# Patient Record
Sex: MALE | Race: WHITE | NOT HISPANIC OR LATINO
[De-identification: names, ages, dates, MRNs, and addresses within clinical notes are randomized per-mention and may not be internally consistent; named-entity substitution may affect disease eponyms.]

---

## 2017-05-09 ENCOUNTER — RX RENEWAL (OUTPATIENT)
Age: 52
End: 2017-05-09

## 2017-06-06 ENCOUNTER — NON-APPOINTMENT (OUTPATIENT)
Age: 52
End: 2017-06-06

## 2017-06-06 ENCOUNTER — APPOINTMENT (OUTPATIENT)
Dept: INTERNAL MEDICINE | Facility: CLINIC | Age: 52
End: 2017-06-06

## 2017-06-06 VITALS
SYSTOLIC BLOOD PRESSURE: 134 MMHG | OXYGEN SATURATION: 96 % | RESPIRATION RATE: 18 BRPM | DIASTOLIC BLOOD PRESSURE: 96 MMHG | HEART RATE: 62 BPM | WEIGHT: 196 LBS | BODY MASS INDEX: 28.06 KG/M2 | HEIGHT: 70 IN | TEMPERATURE: 98.5 F

## 2017-06-06 DIAGNOSIS — K64.9 UNSPECIFIED HEMORRHOIDS: ICD-10-CM

## 2017-06-13 LAB
ALBUMIN SERPL ELPH-MCNC: 4.8 G/DL
ALP BLD-CCNC: 56 U/L
ALT SERPL-CCNC: 41 U/L
ANION GAP SERPL CALC-SCNC: 21 MMOL/L
APPEARANCE: CLEAR
AST SERPL-CCNC: 28 U/L
BASOPHILS # BLD AUTO: 0.01 K/UL
BASOPHILS NFR BLD AUTO: 0.2 %
BILIRUB SERPL-MCNC: 0.3 MG/DL
BILIRUBIN URINE: NEGATIVE
BLOOD URINE: NEGATIVE
BUN SERPL-MCNC: 20 MG/DL
CALCIUM SERPL-MCNC: 9.6 MG/DL
CHLORIDE SERPL-SCNC: 105 MMOL/L
CHOLEST SERPL-MCNC: 207 MG/DL
CHOLEST/HDLC SERPL: 2.9 RATIO
CO2 SERPL-SCNC: 20 MMOL/L
COLOR: YELLOW
CREAT SERPL-MCNC: 1.06 MG/DL
EOSINOPHIL # BLD AUTO: 0.21 K/UL
EOSINOPHIL NFR BLD AUTO: 3.2 %
GLUCOSE QUALITATIVE U: NORMAL MG/DL
GLUCOSE SERPL-MCNC: 115 MG/DL
HBA1C MFR BLD HPLC: 5.5 %
HCT VFR BLD CALC: 44.8 %
HDLC SERPL-MCNC: 72 MG/DL
HGB BLD-MCNC: 15.5 G/DL
IMM GRANULOCYTES NFR BLD AUTO: 0.2 %
KETONES URINE: NEGATIVE
LDLC SERPL CALC-MCNC: 121 MG/DL
LEUKOCYTE ESTERASE URINE: NEGATIVE
LYMPHOCYTES # BLD AUTO: 1.85 K/UL
LYMPHOCYTES NFR BLD AUTO: 27.9 %
MAN DIFF?: NORMAL
MCHC RBC-ENTMCNC: 32.2 PG
MCHC RBC-ENTMCNC: 34.6 GM/DL
MCV RBC AUTO: 92.9 FL
MONOCYTES # BLD AUTO: 0.49 K/UL
MONOCYTES NFR BLD AUTO: 7.4 %
NEUTROPHILS # BLD AUTO: 4.07 K/UL
NEUTROPHILS NFR BLD AUTO: 61.1 %
NITRITE URINE: NEGATIVE
PH URINE: 6
PLATELET # BLD AUTO: 217 K/UL
POTASSIUM SERPL-SCNC: 4.3 MMOL/L
PROT SERPL-MCNC: 7.3 G/DL
PROTEIN URINE: NEGATIVE MG/DL
PSA FREE FLD-MCNC: 15
PSA FREE SERPL-MCNC: 0.09 NG/ML
PSA SERPL-MCNC: 0.6 NG/ML
RBC # BLD: 4.82 M/UL
RBC # FLD: 12.4 %
SODIUM SERPL-SCNC: 146 MMOL/L
SPECIFIC GRAVITY URINE: 1.01
TRIGL SERPL-MCNC: 69 MG/DL
TSH SERPL-ACNC: 1.3 UIU/ML
UROBILINOGEN URINE: NORMAL MG/DL
WBC # FLD AUTO: 6.64 K/UL

## 2017-12-06 LAB — HEMOCCULT STL QL IA: NEGATIVE

## 2017-12-18 ENCOUNTER — RX RENEWAL (OUTPATIENT)
Age: 52
End: 2017-12-18

## 2017-12-19 ENCOUNTER — RX RENEWAL (OUTPATIENT)
Age: 52
End: 2017-12-19

## 2018-02-01 LAB
ALBUMIN SERPL ELPH-MCNC: 4.5 G/DL
ALP BLD-CCNC: 45 U/L
ALT SERPL-CCNC: 40 U/L
ANION GAP SERPL CALC-SCNC: 14 MMOL/L
AST SERPL-CCNC: 28 U/L
BILIRUB SERPL-MCNC: 0.4 MG/DL
BUN SERPL-MCNC: 20 MG/DL
CALCIUM SERPL-MCNC: 9.5 MG/DL
CHLORIDE SERPL-SCNC: 105 MMOL/L
CHOLEST SERPL-MCNC: 193 MG/DL
CHOLEST/HDLC SERPL: 2.3 RATIO
CO2 SERPL-SCNC: 24 MMOL/L
CREAT SERPL-MCNC: 1.07 MG/DL
GLUCOSE SERPL-MCNC: 102 MG/DL
HBA1C MFR BLD HPLC: 5.3 %
HDLC SERPL-MCNC: 83 MG/DL
LDLC SERPL CALC-MCNC: 99 MG/DL
POTASSIUM SERPL-SCNC: 4.9 MMOL/L
PROT SERPL-MCNC: 6.7 G/DL
SODIUM SERPL-SCNC: 143 MMOL/L
TRIGL SERPL-MCNC: 56 MG/DL

## 2018-06-05 ENCOUNTER — RX RENEWAL (OUTPATIENT)
Age: 53
End: 2018-06-05

## 2018-08-28 ENCOUNTER — APPOINTMENT (OUTPATIENT)
Dept: INTERNAL MEDICINE | Facility: CLINIC | Age: 53
End: 2018-08-28
Payer: COMMERCIAL

## 2018-08-28 ENCOUNTER — NON-APPOINTMENT (OUTPATIENT)
Age: 53
End: 2018-08-28

## 2018-08-28 VITALS
TEMPERATURE: 98.6 F | HEIGHT: 70 IN | WEIGHT: 198 LBS | RESPIRATION RATE: 18 BRPM | OXYGEN SATURATION: 98 % | DIASTOLIC BLOOD PRESSURE: 93 MMHG | BODY MASS INDEX: 28.35 KG/M2 | SYSTOLIC BLOOD PRESSURE: 134 MMHG | HEART RATE: 71 BPM

## 2018-08-28 PROCEDURE — 99396 PREV VISIT EST AGE 40-64: CPT

## 2018-08-28 PROCEDURE — 93000 ELECTROCARDIOGRAM COMPLETE: CPT

## 2018-09-04 ENCOUNTER — RX RENEWAL (OUTPATIENT)
Age: 53
End: 2018-09-04

## 2018-09-05 LAB
ALBUMIN SERPL ELPH-MCNC: 4.9 G/DL
ALP BLD-CCNC: 55 U/L
ALT SERPL-CCNC: 37 U/L
ANION GAP SERPL CALC-SCNC: 12 MMOL/L
APPEARANCE: CLEAR
AST SERPL-CCNC: 27 U/L
BASOPHILS # BLD AUTO: 0.03 K/UL
BASOPHILS NFR BLD AUTO: 0.5 %
BILIRUB SERPL-MCNC: 0.5 MG/DL
BILIRUBIN URINE: NEGATIVE
BLOOD URINE: NEGATIVE
BUN SERPL-MCNC: 15 MG/DL
CALCIUM SERPL-MCNC: 9.7 MG/DL
CHLORIDE SERPL-SCNC: 104 MMOL/L
CHOLEST SERPL-MCNC: 184 MG/DL
CHOLEST/HDLC SERPL: 2.9 RATIO
CO2 SERPL-SCNC: 25 MMOL/L
COLOR: YELLOW
CREAT SERPL-MCNC: 0.91 MG/DL
EOSINOPHIL # BLD AUTO: 0.09 K/UL
EOSINOPHIL NFR BLD AUTO: 1.5 %
GLUCOSE QUALITATIVE U: NEGATIVE MG/DL
GLUCOSE SERPL-MCNC: 121 MG/DL
HBA1C MFR BLD HPLC: 5.4 %
HCT VFR BLD CALC: 44.7 %
HDLC SERPL-MCNC: 64 MG/DL
HGB BLD-MCNC: 14.9 G/DL
IMM GRANULOCYTES NFR BLD AUTO: 0.2 %
KETONES URINE: NEGATIVE
LDLC SERPL CALC-MCNC: 106 MG/DL
LEUKOCYTE ESTERASE URINE: NEGATIVE
LYMPHOCYTES # BLD AUTO: 1.17 K/UL
LYMPHOCYTES NFR BLD AUTO: 19.5 %
MAN DIFF?: NORMAL
MCHC RBC-ENTMCNC: 31 PG
MCHC RBC-ENTMCNC: 33.3 GM/DL
MCV RBC AUTO: 92.9 FL
MONOCYTES # BLD AUTO: 0.52 K/UL
MONOCYTES NFR BLD AUTO: 8.7 %
NEUTROPHILS # BLD AUTO: 4.18 K/UL
NEUTROPHILS NFR BLD AUTO: 69.6 %
NITRITE URINE: NEGATIVE
PH URINE: 6.5
PLATELET # BLD AUTO: 217 K/UL
POTASSIUM SERPL-SCNC: 4.4 MMOL/L
PROT SERPL-MCNC: 7 G/DL
PROTEIN URINE: NEGATIVE MG/DL
PSA FREE FLD-MCNC: 15.8
PSA FREE SERPL-MCNC: 0.09 NG/ML
PSA SERPL-MCNC: 0.57 NG/ML
RBC # BLD: 4.81 M/UL
RBC # FLD: 13.1 %
SODIUM SERPL-SCNC: 141 MMOL/L
SPECIFIC GRAVITY URINE: 1.01
T4 FREE SERPL-MCNC: 1.1 NG/DL
TRIGL SERPL-MCNC: 69 MG/DL
TSH SERPL-ACNC: 1.23 UIU/ML
UROBILINOGEN URINE: NEGATIVE MG/DL
WBC # FLD AUTO: 6 K/UL

## 2018-10-10 ENCOUNTER — MEDICATION RENEWAL (OUTPATIENT)
Age: 53
End: 2018-10-10

## 2018-10-18 ENCOUNTER — MED ADMIN CHARGE (OUTPATIENT)
Age: 53
End: 2018-10-18

## 2018-10-18 ENCOUNTER — APPOINTMENT (OUTPATIENT)
Dept: INTERNAL MEDICINE | Facility: CLINIC | Age: 53
End: 2018-10-18
Payer: COMMERCIAL

## 2018-10-18 PROCEDURE — 90715 TDAP VACCINE 7 YRS/> IM: CPT

## 2018-10-18 PROCEDURE — 90471 IMMUNIZATION ADMIN: CPT

## 2018-11-27 ENCOUNTER — RX RENEWAL (OUTPATIENT)
Age: 53
End: 2018-11-27

## 2019-02-26 ENCOUNTER — RX RENEWAL (OUTPATIENT)
Age: 54
End: 2019-02-26

## 2019-05-27 ENCOUNTER — RX RENEWAL (OUTPATIENT)
Age: 54
End: 2019-05-27

## 2019-07-17 ENCOUNTER — MEDICATION RENEWAL (OUTPATIENT)
Age: 54
End: 2019-07-17

## 2019-07-18 ENCOUNTER — APPOINTMENT (OUTPATIENT)
Dept: ORTHOPEDIC SURGERY | Facility: CLINIC | Age: 54
End: 2019-07-18
Payer: COMMERCIAL

## 2019-07-18 VITALS — HEIGHT: 70 IN | WEIGHT: 195 LBS | BODY MASS INDEX: 27.92 KG/M2

## 2019-07-18 DIAGNOSIS — Z86.79 PERSONAL HISTORY OF OTHER DISEASES OF THE CIRCULATORY SYSTEM: ICD-10-CM

## 2019-07-18 DIAGNOSIS — Z78.9 OTHER SPECIFIED HEALTH STATUS: ICD-10-CM

## 2019-07-18 DIAGNOSIS — Z86.39 PERSONAL HISTORY OF OTHER ENDOCRINE, NUTRITIONAL AND METABOLIC DISEASE: ICD-10-CM

## 2019-07-18 PROCEDURE — 20550 NJX 1 TENDON SHEATH/LIGAMENT: CPT | Mod: F7

## 2019-07-18 PROCEDURE — 99203 OFFICE O/P NEW LOW 30 MIN: CPT | Mod: 25

## 2019-07-18 NOTE — END OF VISIT
[FreeTextEntry3] : I, Say Bird MD, ordering physician, have read and attest that all the information, medical decision making and discharge instructions within are true and accurate.

## 2019-07-18 NOTE — ADDENDUM
[FreeTextEntry1] : I, Charo Fritz wrote this note acting as a scribe for Dr. Say Bird on Jul 18, 2019.

## 2019-07-18 NOTE — HISTORY OF PRESENT ILLNESS
[Right] : right hand dominant [FreeTextEntry1] : The patient is a RHD 53 year male who presents with a x 3 months history of locking, pain, and stiffness in his right long and left ring finger and progressively getting worse. The left finger started locking first. The finger symptoms are localized to the palmar aspect of the MP joint. The patient states that their symptoms were of gradual onset. It wakes him up at night. He states that he also wakes up sometimes with the fingers in a locked position. He describes his symptoms as frequent. There is an associated moderate pain. He states his symptoms are worsened by gripping and repetitive use/activity of the hand. He gains relief with rest. He denies any numbness or tingling. There is no history of injury or new activity. He has not received treatment to date. Patient denies DM.

## 2019-07-18 NOTE — PHYSICAL EXAM
[de-identified] : Patient is WDWN, alert, and in no acute distress. Breathing is unlabored. He is grossly oriented to person, place, and time. \par \par Right hand: There is A1 pulley tenderness in the long finger, full arc of motion in the fingers, and all intrinsic and extrinsic hand muscles 5/5. No joint instability on provocative testing, sensation is intact to light touch, and no skin lesions or discoloration. \par \par Left hand: There is A1 pulley tenderness in the ring finger, full arc of motion in the fingers, and all intrinsic and extrinsic hand muscles 5/5. No joint instability on provocative testing, sensation is intact to light touch, and no skin lesions or discoloration.  [de-identified] : No imaging done today.

## 2019-07-18 NOTE — DISCUSSION/SUMMARY
[FreeTextEntry1] : The underlying pathophysiology was reviewed with the patient. Treatment options were discussed including; observation, NSAIDS, analgesics, injection and surgical intervention. \par \par The patient wishes to proceed with a cortisone injection at this time. The skin was prepped with alcohol and sprayed with Ethyl Chloride. An injection of 0.5 cc 1% Lidocaine without epinephrine, 0.25 cc Kenalog 40mg, and 0.25 cc Dexamethasone was administered into the flexor tendon sheath of the right long and left ring finger. The patient tolerated the procedure well. Apply ice. \par \par The patient was advised to soak hand in warm water and Epsom salt. \par NSAIDs as tolerated. \par Patient was advised to continue with observation of his symptoms. Follow up as needed.

## 2019-07-19 ENCOUNTER — APPOINTMENT (OUTPATIENT)
Dept: ORTHOPEDIC SURGERY | Facility: CLINIC | Age: 54
End: 2019-07-19
Payer: COMMERCIAL

## 2019-07-19 VITALS — BODY MASS INDEX: 27.92 KG/M2 | HEIGHT: 70 IN | WEIGHT: 195 LBS

## 2019-07-19 DIAGNOSIS — M47.812 SPONDYLOSIS W/OUT MYELOPATHY OR RADICULOPATHY, CERVICAL REGION: ICD-10-CM

## 2019-07-19 DIAGNOSIS — M16.10 UNILATERAL PRIMARY OSTEOARTHRITIS, UNSPECIFIED HIP: ICD-10-CM

## 2019-07-19 PROCEDURE — 99214 OFFICE O/P EST MOD 30 MIN: CPT

## 2019-07-19 PROCEDURE — 73502 X-RAY EXAM HIP UNI 2-3 VIEWS: CPT | Mod: RT

## 2019-07-19 PROCEDURE — 72040 X-RAY EXAM NECK SPINE 2-3 VW: CPT

## 2019-07-19 NOTE — DISCUSSION/SUMMARY
[de-identified] : Discussed findings of today's exam and possible causes of patient's pain.  Educated patient on their most probable diagnosis of right-sided neck pain due to paraspinal muscle spasm and exacerbation of underlying osteoarthritis, and right hip pain due to mild osteoarthritis and likely degenerative labral tearing.  Reviewed possible courses of treatment, and we collaboratively decided best course of treatment at this time will include conservative management. I advised the patient that his current conditions are exacerbations do to his age and long occupation of working as a deli . Patient has also been doing lots of swimming recently which led to repetitive neck rotational activity, this is the likely etiology of his current neck pain. It is difficult for him to continue swimming as the crawl stroke we'll likely exacerbated neck pain, and altering to a forward head position with breaststroke will likely exacerbate his hip pain. Patient is recommended to limit swimming, and Ativan low impact cardiovascular exercise such as bike and elliptical.  Patient will be started on a course of physical therapy to restore normal range of motion and strength as tolerated. Patient may consider ultrasound guided right hip injection for diagnostic/therapeutic measures as a future treatment option. Recommend advance imaging with MRI of the C-spine to evaluate for facet arthropathy/right-sided herniation, patient does not have significant radiculopathy but may benefit from facet steroid injection if he has persistent right-sided neck pain. He may followup on his MRI results are available.  Patient started on a course of oral NSAIDs, prescription given for Mobic.  Patient appreciates and agrees with current plan.\par \par This note was generated using dragon medical dictation software.  A reasonable effort has been made for proofreading its contents, but typos may still remain.  If there are any questions or points of clarification needed please notify my office.

## 2019-07-19 NOTE — HISTORY OF PRESENT ILLNESS
[de-identified] : Patient is here for neck pain that began about 5 months ago without inciting event. He states that he began swimming and when turning his head to the right to breath he noticed the pain. It has become progressively worse over time. He takes Ibuprofen for headaches occasionally. He states that it feels like someone has their hand around the right side of his neck. Denies N/T/R/Prior injury. \par He also complains of right hip pain that began about 3 months ago without inciting event. The pain is localized to the lateral aspect of his leg but will sometimes radiate posteriorly. He has done nothing to treat this pain. Denies N/T/R/Prior injury.

## 2019-07-19 NOTE — PHYSICAL EXAM
[de-identified] : Constitutional: Well-nourished, well-developed, No acute distress\par Respiratory:  Good respiratory effort, no SOB\par Lymphatic: No regional lymphadenopathy, no lymphedema\par Psychiatric: Pleasant and normal affect, alert and oriented x3\par Skin: Clean dry and intact B/L UE/LE\par Musculoskeletal: normal except where as noted in regional exam\par \par Cervical Spine Exam\par APPEARANCE: no marked deformities or malalignment, normal curvature, good posture\par POSITIVE TENDERNESS: + Right upper trapezius, levator scapula, rhomboid major, and rhomboid minor, + spasm noted in the same muscles.\par NONTENDER: no bony midline tenderness.\par ROM: limited in all planes, most notably in flexion and sidebending due to pain\par RESISTIVE TESTING: painful 4/5 resisted ext, right sidebending, rotation and shoulder shrug , 5/5 flexion \par SPECIAL TESTS: neg Spurling's b/l\par Vasc: 2+ radial pulse b/l\par Neuro: C5 - T1 intact to motor, DTRs 2+/4 biceps, triceps, brachioradialis\par Sensation: Intact to light touch throughout b/l UE\par Thoracic spine:  normal curvature and normal alignment. good posture. no midline bony tenderness, no marked spasm. no marked tenderness in paraspinal muscles.  ROM full & painless all planes\par B/L Shoulders:  No asymmetry, malalignment, or swelling, Full ROM, 5/5 strength in flexion/ext, Abd/Add, IR/ER, Joints stable\par B/L Elbows:  No asymmetry, malalignment, or swelling, Full ROM, 5/5 strength in flexion/ext, pronation/supination, Joints stable\par B/L Wrist and Hand:  No asymmetry, malalignment, or swelling, Full ROM, 5/5 strength in wrist and long finger flexion/ext, radial/ulnar deviation, Joints stable\par \par Left Hip:\par \par APPEARANCE: no marked deformities, no swelling or malalignment\par POSITIVE TENDERNESS: none\par NONTENDER greater trochanter, TFL, gluteal region, ischium/proximal hamstring region, hip flexor region, sartorius and pubic symphysis. \par ROM full, painless all planes. \par RESISTIVE TESTING: painless resisted ER/IR/SLR/abduction/adduction. \par SPECIAL TESTS: painless loaded flexion & scouring\par PULSES: 2+ DP/PT pulses\par Neuro:  NL sensation of thigh and lower extremity, DTRs 2+/4 patella and achilles\par \par \par B/L Ankles: No asymmetry, malalignment, or swelling, Full ROM, 5/5 strength in DF/PF/Inv/Ev, Joints stable\par BIOMECHANICAL EXAM: no marked leg length discrepancy, moderate hip abductor weakness b/l, no marked foot pronation, tight hams and ITB b/l.  Normal gait and station\par \par Bilateral Knees:\par APPEARANCE: no marked deformities, no swelling or malalignment\par POSITIVE TENDERNESS:  + crepitus of the anterior knee, and tenderness of patellar retinaculum\par NONTENDER: jt lines b/l, patellar & quadriceps tendons, MCL/LCL, ITB at the lateral femoral condyle & Gerdy's tubercle, pes bursa. \par ROM: full & painless, although some discomfort in deep knee flexion\par RESISTIVE TESTING: + discomfort with knee ext from deep knee flexion (stretched position), painless knee flexion. \par SPECIAL TESTS: stable v/v stress. painless grind. neg Lachman's. neg ant/post drawer. neg Angelo's. neg Thessaly test. neg Prabha's & Malacrae's\par NEURO: Normal sensation of LE, DTRs 2+/4 patella and achilles\par PULSES: 2+ DP/PT pulses\par \par \par Right Hip:\par \par APPEARANCE: no marked deformities, no swelling or malalignment\par POSITIVE TENDERNESS: Hip flexor region, sartorius, proximal rectus femoris\par NONTENDER: greater trochanter, TFL, gluteal region, ischium/proximal hamstring region, and pubic symphysis. \par ROM: Limited in all planes due to pain. \par RESISTIVE TESTING: MMT 4+/5 and mild pain with hip flexion, painless resisted ER/IR/SLR/abduction/adduction. \par SPECIAL TESTS: + SELENE/FADIR, mild pain with loaded flexion & scouring\par NEURO: Normal sensation of LE, DTRs 2+/4 patella and achilles\par PULSES: 2+ DP/PT pulses\par \par  [de-identified] : The following radiographs were ordered and read by me during this patient's visit. I reviewed each radiograph in detail with the patient and discussed the findings as highlighted below. \par \par 2 views of the cervical spine were obtained today that show degenerative changes and evidence of moderate multilevel osteoarthritis most notably in the C5-C7.  No fracture, or dislocation seen at this time. There is no malalignment. No other obvious osseous abnormality. Otherwise unremarkable.\par \par 2 views of the right hip were obtained today that show degenerative changes and evidence of mild osteoarthritis.  No fracture, or dislocation seen at this time. There is no malalignment. No other obvious osseous abnormality. Otherwise unremarkable.

## 2019-07-25 ENCOUNTER — FORM ENCOUNTER (OUTPATIENT)
Age: 54
End: 2019-07-25

## 2019-07-26 ENCOUNTER — APPOINTMENT (OUTPATIENT)
Dept: MRI IMAGING | Facility: CLINIC | Age: 54
End: 2019-07-26

## 2019-07-26 ENCOUNTER — OUTPATIENT (OUTPATIENT)
Dept: OUTPATIENT SERVICES | Facility: HOSPITAL | Age: 54
LOS: 1 days | End: 2019-07-26
Payer: COMMERCIAL

## 2019-07-26 DIAGNOSIS — Z00.8 ENCOUNTER FOR OTHER GENERAL EXAMINATION: ICD-10-CM

## 2019-07-26 PROCEDURE — 72141 MRI NECK SPINE W/O DYE: CPT

## 2019-07-26 PROCEDURE — 72141 MRI NECK SPINE W/O DYE: CPT | Mod: 26

## 2019-07-30 ENCOUNTER — CLINICAL ADVICE (OUTPATIENT)
Age: 54
End: 2019-07-30

## 2019-09-10 ENCOUNTER — APPOINTMENT (OUTPATIENT)
Dept: INTERNAL MEDICINE | Facility: CLINIC | Age: 54
End: 2019-09-10
Payer: COMMERCIAL

## 2019-09-10 VITALS
BODY MASS INDEX: 28.35 KG/M2 | SYSTOLIC BLOOD PRESSURE: 133 MMHG | HEART RATE: 60 BPM | WEIGHT: 198 LBS | HEIGHT: 70 IN | DIASTOLIC BLOOD PRESSURE: 83 MMHG | OXYGEN SATURATION: 100 % | RESPIRATION RATE: 18 BRPM | TEMPERATURE: 98.6 F

## 2019-09-10 PROCEDURE — 99396 PREV VISIT EST AGE 40-64: CPT

## 2019-09-12 LAB
ALBUMIN SERPL ELPH-MCNC: 5 G/DL
ALP BLD-CCNC: 58 U/L
ALT SERPL-CCNC: 39 U/L
ANION GAP SERPL CALC-SCNC: 14 MMOL/L
APPEARANCE: CLEAR
AST SERPL-CCNC: 30 U/L
BASOPHILS # BLD AUTO: 0.05 K/UL
BASOPHILS NFR BLD AUTO: 0.9 %
BILIRUB SERPL-MCNC: 0.4 MG/DL
BILIRUBIN URINE: NEGATIVE
BLOOD URINE: NEGATIVE
BUN SERPL-MCNC: 15 MG/DL
CALCIUM SERPL-MCNC: 9.8 MG/DL
CHLORIDE SERPL-SCNC: 103 MMOL/L
CHOLEST SERPL-MCNC: 199 MG/DL
CHOLEST/HDLC SERPL: 2.9 RATIO
CO2 SERPL-SCNC: 23 MMOL/L
COLOR: COLORLESS
CREAT SERPL-MCNC: 0.95 MG/DL
EOSINOPHIL # BLD AUTO: 0.05 K/UL
EOSINOPHIL NFR BLD AUTO: 0.9 %
ESTIMATED AVERAGE GLUCOSE: 111 MG/DL
GLUCOSE QUALITATIVE U: NEGATIVE
GLUCOSE SERPL-MCNC: 113 MG/DL
HBA1C MFR BLD HPLC: 5.5 %
HCT VFR BLD CALC: 44.2 %
HDLC SERPL-MCNC: 69 MG/DL
HGB BLD-MCNC: 15.3 G/DL
IMM GRANULOCYTES NFR BLD AUTO: 0.3 %
KETONES URINE: NEGATIVE
LDLC SERPL CALC-MCNC: 120 MG/DL
LEUKOCYTE ESTERASE URINE: NEGATIVE
LYMPHOCYTES # BLD AUTO: 1.12 K/UL
LYMPHOCYTES NFR BLD AUTO: 19.5 %
MAN DIFF?: NORMAL
MCHC RBC-ENTMCNC: 31.2 PG
MCHC RBC-ENTMCNC: 34.6 GM/DL
MCV RBC AUTO: 90 FL
MONOCYTES # BLD AUTO: 0.41 K/UL
MONOCYTES NFR BLD AUTO: 7.1 %
NEUTROPHILS # BLD AUTO: 4.1 K/UL
NEUTROPHILS NFR BLD AUTO: 71.3 %
NITRITE URINE: NEGATIVE
PH URINE: 6.5
PLATELET # BLD AUTO: 239 K/UL
POTASSIUM SERPL-SCNC: 4.3 MMOL/L
PROT SERPL-MCNC: 7.2 G/DL
PROTEIN URINE: NEGATIVE
PSA SERPL-MCNC: 0.67 NG/ML
RBC # BLD: 4.91 M/UL
RBC # FLD: 12.3 %
SODIUM SERPL-SCNC: 140 MMOL/L
SPECIFIC GRAVITY URINE: 1.01
TRIGL SERPL-MCNC: 52 MG/DL
TSH SERPL-ACNC: 1.16 UIU/ML
UROBILINOGEN URINE: NORMAL
WBC # FLD AUTO: 5.75 K/UL

## 2019-09-26 ENCOUNTER — LABORATORY RESULT (OUTPATIENT)
Age: 54
End: 2019-09-26

## 2019-12-12 ENCOUNTER — APPOINTMENT (OUTPATIENT)
Dept: ORTHOPEDIC SURGERY | Facility: CLINIC | Age: 54
End: 2019-12-12
Payer: COMMERCIAL

## 2019-12-12 DIAGNOSIS — M65.331 TRIGGER FINGER, RIGHT MIDDLE FINGER: ICD-10-CM

## 2019-12-12 DIAGNOSIS — M65.342 TRIGGER FINGER, LEFT RING FINGER: ICD-10-CM

## 2019-12-12 PROCEDURE — 20550 NJX 1 TENDON SHEATH/LIGAMENT: CPT | Mod: 59,F7

## 2019-12-12 PROCEDURE — 99213 OFFICE O/P EST LOW 20 MIN: CPT | Mod: 25

## 2019-12-12 NOTE — ADDENDUM
[FreeTextEntry1] : I, Charo Fritz wrote this note acting as a scribe for Dr. Say Bird on Dec 12, 2019.

## 2019-12-12 NOTE — DISCUSSION/SUMMARY
[FreeTextEntry1] : The patient wishes to proceed with a 2nd cortisone injection at this time. The skin was prepped with alcohol and sprayed with Ethyl Chloride. An injection of 0.5 cc 1% Lidocaine without epinephrine, 0.25 cc Kenalog 40mg, and 0.25 cc Dexamethasone was administered into the flexor tendon sheath of the right long and left ring finger. The patient tolerated the procedure well. Apply ice. \par \par The patient was advised to soak hand in warm water and Epsom salt. \par NSAIDs as tolerated. \par Patient was advised to continue with observation of his symptoms. Follow up as needed.

## 2019-12-12 NOTE — PHYSICAL EXAM
[de-identified] : No imaging done today.  [de-identified] : Patient is WDWN, alert, and in no acute distress. Breathing is unlabored. He is grossly oriented to person, place, and time. \par \par Right hand: There is A1 pulley tenderness in the long finger, full arc of motion in the fingers, and all intrinsic and extrinsic hand muscles 5/5. No joint instability on provocative testing, sensation is intact to light touch, and no skin lesions or discoloration. \par \par Left hand: There is A1 pulley tenderness in the ring finger, full arc of motion in the fingers, and all intrinsic and extrinsic hand muscles 5/5. No joint instability on provocative testing, sensation is intact to light touch, and no skin lesions or discoloration.

## 2019-12-12 NOTE — HISTORY OF PRESENT ILLNESS
[Right] : right hand dominant [FreeTextEntry1] : The patient is a RHD 53 year male who presents for a followup visit involving a recurrent history of locking, pain, and stiffness in his right long and left ring finger and progressively getting worse. The left finger started locking first. The finger symptoms are localized to the palmar aspect of the MP joint. The patient states that their symptoms were of gradual onset. It wakes him up at night. He states that he also wakes up sometimes with the fingers in a locked position and requires him to manually return them to neutral. He describes his symptoms as frequent. There is an associated moderate pain. He states his symptoms are worsened by gripping and repetitive use/activity of the hand. He was treated with a cortisone injection into both fingers on 07/18/2019. The injections provided him with significant symptom relief until he noticed them return at the end of September. He would like another injection today.

## 2020-02-25 ENCOUNTER — RX RENEWAL (OUTPATIENT)
Age: 55
End: 2020-02-25

## 2020-03-13 ENCOUNTER — APPOINTMENT (OUTPATIENT)
Dept: OTOLARYNGOLOGY | Facility: CLINIC | Age: 55
End: 2020-03-13
Payer: COMMERCIAL

## 2020-03-13 ENCOUNTER — TRANSCRIPTION ENCOUNTER (OUTPATIENT)
Age: 55
End: 2020-03-13

## 2020-03-13 VITALS
SYSTOLIC BLOOD PRESSURE: 161 MMHG | DIASTOLIC BLOOD PRESSURE: 100 MMHG | HEART RATE: 107 BPM | BODY MASS INDEX: 27.2 KG/M2 | WEIGHT: 190 LBS | HEIGHT: 70 IN

## 2020-03-13 PROCEDURE — 99204 OFFICE O/P NEW MOD 45 MIN: CPT

## 2020-03-13 NOTE — ASSESSMENT
[FreeTextEntry1] : B/L parotid enlargement / Parotitis\par - Continue Augmentin Rx by Urgent care\par - Stay well hydrated\par - Sialagogues \par - Massage and warm compresses\par - If persists, f/u with Dr. Black for further evaluation of salivary ducts. \par \par HTN\par - F/U with PMD\par

## 2020-03-13 NOTE — HISTORY OF PRESENT ILLNESS
[de-identified] : 53 y/o M, woke up this morning with b/l facial swelling.  Had some pain that has decreased today.  No dry mouth.  Notes did not get worse today with eating.  No dry mouth or bad taste in the mouth.  Reports Temp this morning 99F, He also took 3 Ibuprofen.  Reports going to Urgent care today and was given Augmentin and told to f/u with ENT.

## 2020-03-13 NOTE — PHYSICAL EXAM
[Midline] : trachea located in midline position [Normal] : no rashes [de-identified] : b/l parotid swelling with no palpable masses, nontender [de-identified] : b/l parotid swelling R > L.  Clear fluid from b/l parotid ducts.

## 2020-03-13 NOTE — CONSULT LETTER
[Dear  ___] : Dear  [unfilled], [Consult Letter:] : I had the pleasure of evaluating your patient, [unfilled]. [Please see my note below.] : Please see my note below. [Consult Closing:] : Thank you very much for allowing me to participate in the care of this patient.  If you have any questions, please do not hesitate to contact me. [Sincerely,] : Sincerely, [FreeTextEntry3] : Angelic Oneill MD\par Otolaryngology and Cranial Base Surgery\par Attending Physician - Department of Otolaryngology and Head & Neck Surgery\par [facility]\par  - [employer] [facility]\par Office: [phone]\par Fax: [phone]\par

## 2020-03-30 ENCOUNTER — RX RENEWAL (OUTPATIENT)
Age: 55
End: 2020-03-30

## 2020-05-05 ENCOUNTER — APPOINTMENT (OUTPATIENT)
Dept: OTOLARYNGOLOGY | Facility: CLINIC | Age: 55
End: 2020-05-05
Payer: COMMERCIAL

## 2020-05-05 VITALS
SYSTOLIC BLOOD PRESSURE: 161 MMHG | WEIGHT: 190 LBS | BODY MASS INDEX: 27.2 KG/M2 | HEIGHT: 70 IN | DIASTOLIC BLOOD PRESSURE: 101 MMHG | HEART RATE: 91 BPM

## 2020-05-05 DIAGNOSIS — R60.9 EDEMA, UNSPECIFIED: ICD-10-CM

## 2020-05-05 PROCEDURE — 69210 REMOVE IMPACTED EAR WAX UNI: CPT

## 2020-05-05 PROCEDURE — 99214 OFFICE O/P EST MOD 30 MIN: CPT | Mod: 25

## 2020-05-05 NOTE — CONSULT LETTER
[FreeTextEntry1] : Dear Dr. MARIA E HERNANDEZ \par I had the pleasure of evaluating your patient JOHNATHON CHEUNG, thank you for allowing us to participate in their care. please see full note detailing our visit below.\par If you have any questions, please do not hesitate to call me and I would be happy to discuss further. \par \par Joey Black M.D.\par Attending Physician,  \par Department of Otolaryngology - Head and Neck Surgery\par [facility] \par Office: [phone]\par Fax: [phone]\par \par

## 2020-05-05 NOTE — PHYSICAL EXAM
[Midline] : trachea located in midline position [Normal] : no rashes [de-identified] : osteoma anterior/superiorly in left ear canal

## 2020-05-05 NOTE — HISTORY OF PRESENT ILLNESS
[de-identified] : Pt with hx of b/l facial swelling, pt tested positive for mumps at urgent care 3/2019. Pt was told to discontinue Augmentin. Today pt is complaining of intermittent mild discomfort than pain of the R>L parotid gland. \par Pt denies any swelling, dry mouth, fevers, ear pain, throat pain.  \par \par

## 2020-05-05 NOTE — ASSESSMENT
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

## 2020-05-06 ENCOUNTER — APPOINTMENT (OUTPATIENT)
Dept: INTERNAL MEDICINE | Facility: CLINIC | Age: 55
End: 2020-05-06

## 2020-05-06 ENCOUNTER — APPOINTMENT (OUTPATIENT)
Dept: INTERNAL MEDICINE | Facility: CLINIC | Age: 55
End: 2020-05-06
Payer: COMMERCIAL

## 2020-05-06 DIAGNOSIS — K11.20 SIALOADENITIS, UNSPECIFIED: ICD-10-CM

## 2020-05-06 PROCEDURE — 99213 OFFICE O/P EST LOW 20 MIN: CPT | Mod: 95

## 2020-05-06 RX ORDER — MELOXICAM 7.5 MG/1
7.5 TABLET ORAL
Qty: 30 | Refills: 0 | Status: DISCONTINUED | COMMUNITY
Start: 2019-07-19 | End: 2020-05-06

## 2020-05-06 RX ORDER — AMOXICILLIN AND CLAVULANATE POTASSIUM 875; 125 MG/1; MG/1
875-125 TABLET, COATED ORAL
Qty: 14 | Refills: 0 | Status: DISCONTINUED | COMMUNITY
Start: 2020-03-13 | End: 2020-05-06

## 2020-05-28 ENCOUNTER — APPOINTMENT (OUTPATIENT)
Dept: INTERNAL MEDICINE | Facility: CLINIC | Age: 55
End: 2020-05-28
Payer: COMMERCIAL

## 2020-05-28 VITALS — DIASTOLIC BLOOD PRESSURE: 80 MMHG | SYSTOLIC BLOOD PRESSURE: 140 MMHG

## 2020-05-28 PROCEDURE — 99213 OFFICE O/P EST LOW 20 MIN: CPT | Mod: 95

## 2020-05-28 RX ORDER — IRBESARTAN 150 MG/1
150 TABLET ORAL
Qty: 30 | Refills: 5 | Status: DISCONTINUED | COMMUNITY
Start: 2020-05-06 | End: 2020-05-28

## 2020-06-10 ENCOUNTER — APPOINTMENT (OUTPATIENT)
Dept: CARDIOLOGY | Facility: CLINIC | Age: 55
End: 2020-06-10
Payer: COMMERCIAL

## 2020-06-10 ENCOUNTER — NON-APPOINTMENT (OUTPATIENT)
Age: 55
End: 2020-06-10

## 2020-06-10 VITALS
BODY MASS INDEX: 27.06 KG/M2 | HEIGHT: 70 IN | SYSTOLIC BLOOD PRESSURE: 173 MMHG | WEIGHT: 189 LBS | HEART RATE: 103 BPM | OXYGEN SATURATION: 99 % | DIASTOLIC BLOOD PRESSURE: 103 MMHG

## 2020-06-10 VITALS — DIASTOLIC BLOOD PRESSURE: 90 MMHG | SYSTOLIC BLOOD PRESSURE: 160 MMHG

## 2020-06-10 DIAGNOSIS — R03.0 ELEVATED BLOOD-PRESSURE READING, W/OUT DIAGNOSIS OF HYPERTENSION: ICD-10-CM

## 2020-06-10 PROCEDURE — 93000 ELECTROCARDIOGRAM COMPLETE: CPT

## 2020-06-10 PROCEDURE — 99204 OFFICE O/P NEW MOD 45 MIN: CPT

## 2020-06-15 ENCOUNTER — APPOINTMENT (OUTPATIENT)
Dept: CARDIOLOGY | Facility: CLINIC | Age: 55
End: 2020-06-15

## 2020-06-16 ENCOUNTER — APPOINTMENT (OUTPATIENT)
Dept: CARDIOLOGY | Facility: CLINIC | Age: 55
End: 2020-06-16
Payer: COMMERCIAL

## 2020-06-16 PROCEDURE — 93790 AMBL BP MNTR W/SW I&R: CPT

## 2020-06-17 LAB
ALBUMIN SERPL ELPH-MCNC: 4.9 G/DL
ALP BLD-CCNC: 59 U/L
ALT SERPL-CCNC: 36 U/L
ANION GAP SERPL CALC-SCNC: 13 MMOL/L
AST SERPL-CCNC: 24 U/L
BILIRUB SERPL-MCNC: 0.5 MG/DL
BUN SERPL-MCNC: 12 MG/DL
CALCIUM SERPL-MCNC: 9.4 MG/DL
CHLORIDE SERPL-SCNC: 103 MMOL/L
CHOLEST SERPL-MCNC: 158 MG/DL
CHOLEST/HDLC SERPL: 2.7 RATIO
CO2 SERPL-SCNC: 26 MMOL/L
CREAT SERPL-MCNC: 1 MG/DL
GLUCOSE SERPL-MCNC: 121 MG/DL
HDLC SERPL-MCNC: 59 MG/DL
LDLC SERPL CALC-MCNC: 86 MG/DL
POTASSIUM SERPL-SCNC: 4 MMOL/L
PROT SERPL-MCNC: 6.7 G/DL
SODIUM SERPL-SCNC: 142 MMOL/L
TRIGL SERPL-MCNC: 66 MG/DL

## 2020-06-25 ENCOUNTER — APPOINTMENT (OUTPATIENT)
Dept: CARDIOLOGY | Facility: CLINIC | Age: 55
End: 2020-06-25
Payer: COMMERCIAL

## 2020-06-25 PROCEDURE — 93306 TTE W/DOPPLER COMPLETE: CPT

## 2020-07-07 ENCOUNTER — APPOINTMENT (OUTPATIENT)
Dept: CARDIOLOGY | Facility: CLINIC | Age: 55
End: 2020-07-07
Payer: COMMERCIAL

## 2020-07-07 PROCEDURE — 93015 CV STRESS TEST SUPVJ I&R: CPT

## 2020-07-21 ENCOUNTER — NON-APPOINTMENT (OUTPATIENT)
Age: 55
End: 2020-07-21

## 2020-07-21 ENCOUNTER — APPOINTMENT (OUTPATIENT)
Dept: CARDIOLOGY | Facility: CLINIC | Age: 55
End: 2020-07-21
Payer: COMMERCIAL

## 2020-07-21 VITALS
BODY MASS INDEX: 27.2 KG/M2 | HEART RATE: 69 BPM | WEIGHT: 190 LBS | DIASTOLIC BLOOD PRESSURE: 93 MMHG | HEIGHT: 70 IN | OXYGEN SATURATION: 98 % | SYSTOLIC BLOOD PRESSURE: 168 MMHG

## 2020-07-21 VITALS — DIASTOLIC BLOOD PRESSURE: 90 MMHG | SYSTOLIC BLOOD PRESSURE: 158 MMHG

## 2020-07-21 PROCEDURE — 99215 OFFICE O/P EST HI 40 MIN: CPT

## 2020-07-21 PROCEDURE — 93000 ELECTROCARDIOGRAM COMPLETE: CPT

## 2020-08-17 ENCOUNTER — APPOINTMENT (OUTPATIENT)
Dept: CARDIOLOGY | Facility: CLINIC | Age: 55
End: 2020-08-17
Payer: COMMERCIAL

## 2020-08-17 ENCOUNTER — NON-APPOINTMENT (OUTPATIENT)
Age: 55
End: 2020-08-17

## 2020-08-17 VITALS
DIASTOLIC BLOOD PRESSURE: 90 MMHG | BODY MASS INDEX: 27.92 KG/M2 | OXYGEN SATURATION: 99 % | HEIGHT: 70 IN | WEIGHT: 195 LBS | HEART RATE: 78 BPM | SYSTOLIC BLOOD PRESSURE: 176 MMHG

## 2020-08-17 VITALS — DIASTOLIC BLOOD PRESSURE: 90 MMHG | SYSTOLIC BLOOD PRESSURE: 150 MMHG

## 2020-08-17 DIAGNOSIS — R42 DIZZINESS AND GIDDINESS: ICD-10-CM

## 2020-08-17 PROCEDURE — 99215 OFFICE O/P EST HI 40 MIN: CPT

## 2020-08-17 PROCEDURE — 93000 ELECTROCARDIOGRAM COMPLETE: CPT

## 2020-08-18 ENCOUNTER — RX RENEWAL (OUTPATIENT)
Age: 55
End: 2020-08-18

## 2020-09-15 ENCOUNTER — NON-APPOINTMENT (OUTPATIENT)
Age: 55
End: 2020-09-15

## 2020-09-15 ENCOUNTER — APPOINTMENT (OUTPATIENT)
Dept: CARDIOLOGY | Facility: CLINIC | Age: 55
End: 2020-09-15
Payer: COMMERCIAL

## 2020-09-15 VITALS
WEIGHT: 192 LBS | HEART RATE: 59 BPM | BODY MASS INDEX: 27.49 KG/M2 | HEIGHT: 70 IN | SYSTOLIC BLOOD PRESSURE: 155 MMHG | DIASTOLIC BLOOD PRESSURE: 94 MMHG | OXYGEN SATURATION: 99 %

## 2020-09-15 DIAGNOSIS — R00.0 TACHYCARDIA, UNSPECIFIED: ICD-10-CM

## 2020-09-15 PROCEDURE — 93000 ELECTROCARDIOGRAM COMPLETE: CPT

## 2020-09-15 PROCEDURE — 99215 OFFICE O/P EST HI 40 MIN: CPT

## 2020-09-20 ENCOUNTER — RX RENEWAL (OUTPATIENT)
Age: 55
End: 2020-09-20

## 2020-10-02 ENCOUNTER — APPOINTMENT (OUTPATIENT)
Dept: ORTHOPEDIC SURGERY | Facility: CLINIC | Age: 55
End: 2020-10-02
Payer: COMMERCIAL

## 2020-10-02 PROCEDURE — 72100 X-RAY EXAM L-S SPINE 2/3 VWS: CPT

## 2020-10-02 PROCEDURE — 99214 OFFICE O/P EST MOD 30 MIN: CPT

## 2020-10-02 NOTE — PHYSICAL EXAM
[de-identified] : Constitutional: Well-nourished, well-developed, No acute distress\par Respiratory:  Good respiratory effort, no SOB\par Lymphatic: No regional lymphadenopathy, no lymphedema\par Psychiatric: Pleasant and normal affect, alert and oriented x3\par Skin: Clean dry and intact B/L UE/LE\par Musculoskeletal: normal except where as noted in regional exam\par \par Lumbar Spine Exam\par \par APPEARANCE: no marked deformities or malalignment, + loss of lordotic curvature of the lumbosacral spine. no marked deformities. + poor posture\par POSITIVE TENDERNESS: + Left IL/SI/ST ligs, + TTP of Left SI joint, + b/l lower lumbar tenderness and spasm noted in erector spinae and quadratus lumborum\par NONTENDER: no bony midline tenderness.\par ROM: Mildly limited in all directions, mildly painful at end range of flexion and extension\par RESISTIVE TESTING: painless 5/5 resisted flex/ext, sidebending b/l, and rotation\par SPECIAL TESTS: neg SLR b/l, + Left SELENE, neg Trendelenburg b/l \par PULSES: 2+ DP/PT pulses\par NEURO:  L1 - S2 intact to sensation and motor, DTRs 2+/4 patella and achilles\par \par B/L Hips: No asymmetry, malalignment, or swelling, Full ROM, 5/5 strength in flexion/ext, IR/ER, Abd/Add, Joints stable\par B/L Knees: No asymmetry, malalignment, or swelling, Full ROM, 5/5 strength in flexion/ext, Joints stable\par B/L Ankles: No asymmetry, malalignment, or swelling, Full ROM, 5/5 strength in DF/PF/Inv/Ev, Joints stable\par \par  [de-identified] : The following radiographs were ordered and read by me during this patient's visit. I reviewed each radiograph in detail with the patient and discussed the findings as highlighted below. \par \par 2 views of the lumbar spine were obtained today that show degenerative changes and evidence of mild multilevel osteoarthritis.  No fracture, or dislocation seen at this time. There is no malalignment. No other obvious osseous abnormality. Otherwise unremarkable.

## 2020-10-02 NOTE — DISCUSSION/SUMMARY
[de-identified] : Discussed findings of today's exam and possible causes of patient's pain.  Educated patient on their most probable diagnosis of low back pain with left lumbar radiculopathy.  Reviewed possible courses of treatment, and we collaboratively decided best course of treatment at this time will include conservative management.  At this time recommend a course of oral prednisone with an appropriate taper (We discussed all possible side effects of this medication), patient advised to not take oral NSAIDs while on prednisone.  Patient will be started on a course of physical therapy to restore normal range of motion and strength as tolerated.  I also recommend advanced imaging with MRI to evaluate for left-sided herniation.  Patient will follow up when MRI results are available.  Patient appreciates and agrees with current plan.\par \par This note was generated using dragon medical dictation software.  A reasonable effort has been made for proofreading its contents, but typos may still remain.  If there are any questions or points of clarification needed please notify my office.

## 2020-10-02 NOTE — HISTORY OF PRESENT ILLNESS
[de-identified] : Patient is here for left sided LBP that began about a month ago without inciting injury. The pain has worsened over time. He states that as he goes through his day, the pain increases and he will have difficulty getting to his car after work. He has taken Advil only twice as he has been having issues with his BP. He will feel pain that radiates down his left leg with numbness in his foot. Denies prior injury/bowel or bladder incontinence/saddle anesthesia. \par \par The patient's past medical history, past surgical history, medications and allergies were reviewed by me today and documented accordingly. In addition, the patient's family and social history, which were noncontributory to this visit, were reviewed also. Intake form was reviewed. The patient has no family history of arthritis.

## 2020-12-11 ENCOUNTER — TRANSCRIPTION ENCOUNTER (OUTPATIENT)
Age: 55
End: 2020-12-11

## 2020-12-21 ENCOUNTER — RX RENEWAL (OUTPATIENT)
Age: 55
End: 2020-12-21

## 2020-12-22 ENCOUNTER — APPOINTMENT (OUTPATIENT)
Dept: INTERNAL MEDICINE | Facility: CLINIC | Age: 55
End: 2020-12-22
Payer: COMMERCIAL

## 2020-12-22 VITALS — DIASTOLIC BLOOD PRESSURE: 80 MMHG | SYSTOLIC BLOOD PRESSURE: 132 MMHG

## 2020-12-22 DIAGNOSIS — D16.9 BENIGN NEOPLASM OF BONE AND ARTICULAR CARTILAGE, UNSPECIFIED: ICD-10-CM

## 2020-12-22 DIAGNOSIS — R73.01 IMPAIRED FASTING GLUCOSE: ICD-10-CM

## 2020-12-22 DIAGNOSIS — Z23 ENCOUNTER FOR IMMUNIZATION: ICD-10-CM

## 2020-12-22 DIAGNOSIS — L84 CORNS AND CALLOSITIES: ICD-10-CM

## 2020-12-22 DIAGNOSIS — Z11.59 ENCOUNTER FOR SCREENING FOR OTHER VIRAL DISEASES: ICD-10-CM

## 2020-12-22 DIAGNOSIS — Z83.3 FAMILY HISTORY OF DIABETES MELLITUS: ICD-10-CM

## 2020-12-22 DIAGNOSIS — M54.16 RADICULOPATHY, LUMBAR REGION: ICD-10-CM

## 2020-12-22 PROCEDURE — 99214 OFFICE O/P EST MOD 30 MIN: CPT | Mod: 95

## 2021-01-06 ENCOUNTER — LABORATORY RESULT (OUTPATIENT)
Age: 56
End: 2021-01-06

## 2021-01-11 LAB
ALBUMIN SERPL ELPH-MCNC: 4.6 G/DL
ALP BLD-CCNC: 64 U/L
ALT SERPL-CCNC: 49 U/L
ANION GAP SERPL CALC-SCNC: 10 MMOL/L
AST SERPL-CCNC: 29 U/L
BILIRUB SERPL-MCNC: 0.4 MG/DL
BUN SERPL-MCNC: 22 MG/DL
CALCIUM SERPL-MCNC: 9.5 MG/DL
CHLORIDE SERPL-SCNC: 103 MMOL/L
CHOLEST SERPL-MCNC: 187 MG/DL
CO2 SERPL-SCNC: 25 MMOL/L
CREAT SERPL-MCNC: 1.21 MG/DL
ESTIMATED AVERAGE GLUCOSE: 105 MG/DL
GLUCOSE SERPL-MCNC: 115 MG/DL
HBA1C MFR BLD HPLC: 5.3 %
HDLC SERPL-MCNC: 60 MG/DL
LDLC SERPL CALC-MCNC: 104 MG/DL
NONHDLC SERPL-MCNC: 127 MG/DL
POTASSIUM SERPL-SCNC: 4 MMOL/L
PROT SERPL-MCNC: 6.7 G/DL
SARS-COV-2 IGG SERPL IA-ACNC: <0.1 INDEX
SARS-COV-2 IGG SERPL QL IA: NEGATIVE
SODIUM SERPL-SCNC: 138 MMOL/L
TRIGL SERPL-MCNC: 114 MG/DL
URATE SERPL-MCNC: 6.5 MG/DL
VIT B12 SERPL-MCNC: 470 PG/ML

## 2021-01-27 ENCOUNTER — APPOINTMENT (OUTPATIENT)
Dept: INTERNAL MEDICINE | Facility: CLINIC | Age: 56
End: 2021-01-27
Payer: COMMERCIAL

## 2021-01-27 VITALS
SYSTOLIC BLOOD PRESSURE: 140 MMHG | RESPIRATION RATE: 17 BRPM | TEMPERATURE: 98 F | OXYGEN SATURATION: 100 % | DIASTOLIC BLOOD PRESSURE: 80 MMHG | HEIGHT: 70 IN | HEART RATE: 65 BPM | WEIGHT: 194 LBS | BODY MASS INDEX: 27.77 KG/M2

## 2021-01-27 VITALS — DIASTOLIC BLOOD PRESSURE: 80 MMHG | SYSTOLIC BLOOD PRESSURE: 136 MMHG

## 2021-01-27 PROCEDURE — 99072 ADDL SUPL MATRL&STAF TM PHE: CPT

## 2021-01-27 PROCEDURE — 99396 PREV VISIT EST AGE 40-64: CPT

## 2021-02-10 ENCOUNTER — RX RENEWAL (OUTPATIENT)
Age: 56
End: 2021-02-10

## 2021-03-15 ENCOUNTER — APPOINTMENT (OUTPATIENT)
Dept: CARDIOLOGY | Facility: CLINIC | Age: 56
End: 2021-03-15
Payer: COMMERCIAL

## 2021-03-15 ENCOUNTER — NON-APPOINTMENT (OUTPATIENT)
Age: 56
End: 2021-03-15

## 2021-03-15 VITALS
HEIGHT: 70 IN | OXYGEN SATURATION: 99 % | WEIGHT: 202 LBS | HEART RATE: 55 BPM | SYSTOLIC BLOOD PRESSURE: 183 MMHG | BODY MASS INDEX: 28.92 KG/M2 | DIASTOLIC BLOOD PRESSURE: 88 MMHG

## 2021-03-15 PROCEDURE — 93000 ELECTROCARDIOGRAM COMPLETE: CPT

## 2021-03-15 PROCEDURE — 99072 ADDL SUPL MATRL&STAF TM PHE: CPT

## 2021-03-15 PROCEDURE — 99215 OFFICE O/P EST HI 40 MIN: CPT

## 2021-03-15 RX ORDER — PREDNISONE 5 MG/1
5 TABLET ORAL
Qty: 30 | Refills: 0 | Status: DISCONTINUED | COMMUNITY
Start: 2020-10-02 | End: 2021-03-15

## 2021-03-18 ENCOUNTER — NON-APPOINTMENT (OUTPATIENT)
Age: 56
End: 2021-03-18

## 2021-04-20 ENCOUNTER — APPOINTMENT (OUTPATIENT)
Dept: CARDIOLOGY | Facility: CLINIC | Age: 56
End: 2021-04-20
Payer: COMMERCIAL

## 2021-04-20 ENCOUNTER — NON-APPOINTMENT (OUTPATIENT)
Age: 56
End: 2021-04-20

## 2021-04-20 VITALS
SYSTOLIC BLOOD PRESSURE: 160 MMHG | WEIGHT: 199 LBS | HEART RATE: 52 BPM | OXYGEN SATURATION: 99 % | DIASTOLIC BLOOD PRESSURE: 94 MMHG | HEIGHT: 70 IN | BODY MASS INDEX: 28.49 KG/M2

## 2021-04-20 PROCEDURE — 99215 OFFICE O/P EST HI 40 MIN: CPT

## 2021-04-20 PROCEDURE — 99072 ADDL SUPL MATRL&STAF TM PHE: CPT

## 2021-04-20 PROCEDURE — 93000 ELECTROCARDIOGRAM COMPLETE: CPT

## 2021-04-20 RX ORDER — AMLODIPINE BESYLATE 5 MG/1
5 TABLET ORAL DAILY
Qty: 90 | Refills: 3 | Status: DISCONTINUED | COMMUNITY
Start: 2021-03-15 | End: 2021-04-20

## 2021-06-01 ENCOUNTER — APPOINTMENT (OUTPATIENT)
Dept: CARDIOLOGY | Facility: CLINIC | Age: 56
End: 2021-06-01
Payer: COMMERCIAL

## 2021-06-01 ENCOUNTER — NON-APPOINTMENT (OUTPATIENT)
Age: 56
End: 2021-06-01

## 2021-06-01 VITALS
DIASTOLIC BLOOD PRESSURE: 84 MMHG | SYSTOLIC BLOOD PRESSURE: 153 MMHG | WEIGHT: 199 LBS | HEIGHT: 70 IN | BODY MASS INDEX: 28.49 KG/M2 | HEART RATE: 69 BPM | OXYGEN SATURATION: 97 %

## 2021-06-01 PROCEDURE — 93000 ELECTROCARDIOGRAM COMPLETE: CPT

## 2021-06-01 PROCEDURE — 99215 OFFICE O/P EST HI 40 MIN: CPT

## 2021-06-01 PROCEDURE — 99072 ADDL SUPL MATRL&STAF TM PHE: CPT

## 2021-06-08 ENCOUNTER — APPOINTMENT (OUTPATIENT)
Dept: CARDIOLOGY | Facility: CLINIC | Age: 56
End: 2021-06-08
Payer: COMMERCIAL

## 2021-06-08 PROCEDURE — 99072 ADDL SUPL MATRL&STAF TM PHE: CPT

## 2021-06-08 PROCEDURE — 93790 AMBL BP MNTR W/SW I&R: CPT

## 2021-06-09 LAB
ALBUMIN SERPL ELPH-MCNC: 4.6 G/DL
ALP BLD-CCNC: 54 U/L
ALT SERPL-CCNC: 28 U/L
ANION GAP SERPL CALC-SCNC: 12 MMOL/L
AST SERPL-CCNC: 21 U/L
BILIRUB SERPL-MCNC: 0.4 MG/DL
BUN SERPL-MCNC: 16 MG/DL
CALCIUM SERPL-MCNC: 9.6 MG/DL
CHLORIDE SERPL-SCNC: 101 MMOL/L
CHOLEST SERPL-MCNC: 173 MG/DL
CO2 SERPL-SCNC: 26 MMOL/L
CREAT SERPL-MCNC: 1.01 MG/DL
ESTIMATED AVERAGE GLUCOSE: 108 MG/DL
GLUCOSE SERPL-MCNC: 115 MG/DL
HBA1C MFR BLD HPLC: 5.4 %
HDLC SERPL-MCNC: 55 MG/DL
LDLC SERPL CALC-MCNC: 103 MG/DL
NONHDLC SERPL-MCNC: 118 MG/DL
POTASSIUM SERPL-SCNC: 3.8 MMOL/L
PROT SERPL-MCNC: 6.7 G/DL
SODIUM SERPL-SCNC: 139 MMOL/L
TRIGL SERPL-MCNC: 72 MG/DL

## 2021-07-12 ENCOUNTER — NON-APPOINTMENT (OUTPATIENT)
Age: 56
End: 2021-07-12

## 2021-07-12 ENCOUNTER — APPOINTMENT (OUTPATIENT)
Dept: CARDIOLOGY | Facility: CLINIC | Age: 56
End: 2021-07-12
Payer: COMMERCIAL

## 2021-07-12 VITALS
BODY MASS INDEX: 28.63 KG/M2 | HEART RATE: 64 BPM | WEIGHT: 200 LBS | HEIGHT: 70 IN | SYSTOLIC BLOOD PRESSURE: 150 MMHG | DIASTOLIC BLOOD PRESSURE: 87 MMHG | OXYGEN SATURATION: 97 %

## 2021-07-12 PROCEDURE — 99072 ADDL SUPL MATRL&STAF TM PHE: CPT

## 2021-07-12 PROCEDURE — 93000 ELECTROCARDIOGRAM COMPLETE: CPT

## 2021-07-12 PROCEDURE — 99215 OFFICE O/P EST HI 40 MIN: CPT

## 2021-07-14 ENCOUNTER — RX RENEWAL (OUTPATIENT)
Age: 56
End: 2021-07-14

## 2021-07-26 ENCOUNTER — RX RENEWAL (OUTPATIENT)
Age: 56
End: 2021-07-26

## 2021-08-08 ENCOUNTER — RX RENEWAL (OUTPATIENT)
Age: 56
End: 2021-08-08

## 2021-08-27 LAB
ALBUMIN SERPL ELPH-MCNC: 4.6 G/DL
ALP BLD-CCNC: 52 U/L
ALT SERPL-CCNC: 34 U/L
ANION GAP SERPL CALC-SCNC: 9 MMOL/L
AST SERPL-CCNC: 27 U/L
BILIRUB SERPL-MCNC: 0.6 MG/DL
BUN SERPL-MCNC: 13 MG/DL
CALCIUM SERPL-MCNC: 9.4 MG/DL
CHLORIDE SERPL-SCNC: 100 MMOL/L
CHOLEST SERPL-MCNC: 144 MG/DL
CO2 SERPL-SCNC: 27 MMOL/L
CREAT SERPL-MCNC: 0.99 MG/DL
ESTIMATED AVERAGE GLUCOSE: 108 MG/DL
GLUCOSE SERPL-MCNC: 111 MG/DL
HBA1C MFR BLD HPLC: 5.4 %
HDLC SERPL-MCNC: 60 MG/DL
LDLC SERPL CALC-MCNC: 75 MG/DL
NONHDLC SERPL-MCNC: 84 MG/DL
POTASSIUM SERPL-SCNC: 3.8 MMOL/L
PROT SERPL-MCNC: 6.6 G/DL
SODIUM SERPL-SCNC: 136 MMOL/L
TRIGL SERPL-MCNC: 47 MG/DL

## 2021-10-12 ENCOUNTER — NON-APPOINTMENT (OUTPATIENT)
Age: 56
End: 2021-10-12

## 2021-10-12 ENCOUNTER — APPOINTMENT (OUTPATIENT)
Dept: CARDIOLOGY | Facility: CLINIC | Age: 56
End: 2021-10-12
Payer: COMMERCIAL

## 2021-10-12 VITALS
DIASTOLIC BLOOD PRESSURE: 95 MMHG | HEIGHT: 70 IN | OXYGEN SATURATION: 100 % | HEART RATE: 50 BPM | BODY MASS INDEX: 28.63 KG/M2 | SYSTOLIC BLOOD PRESSURE: 168 MMHG | WEIGHT: 200 LBS

## 2021-10-12 VITALS — DIASTOLIC BLOOD PRESSURE: 86 MMHG | SYSTOLIC BLOOD PRESSURE: 134 MMHG

## 2021-10-12 PROCEDURE — 99215 OFFICE O/P EST HI 40 MIN: CPT

## 2021-10-12 PROCEDURE — 93000 ELECTROCARDIOGRAM COMPLETE: CPT

## 2021-11-01 ENCOUNTER — APPOINTMENT (OUTPATIENT)
Dept: CARDIOLOGY | Facility: CLINIC | Age: 56
End: 2021-11-01
Payer: COMMERCIAL

## 2021-11-01 PROCEDURE — 93015 CV STRESS TEST SUPVJ I&R: CPT

## 2022-01-18 ENCOUNTER — RX RENEWAL (OUTPATIENT)
Age: 57
End: 2022-01-18

## 2022-02-03 ENCOUNTER — RX RENEWAL (OUTPATIENT)
Age: 57
End: 2022-02-03

## 2022-02-09 ENCOUNTER — APPOINTMENT (OUTPATIENT)
Dept: INTERNAL MEDICINE | Facility: CLINIC | Age: 57
End: 2022-02-09
Payer: COMMERCIAL

## 2022-02-09 VITALS
SYSTOLIC BLOOD PRESSURE: 145 MMHG | TEMPERATURE: 97.8 F | HEART RATE: 60 BPM | HEIGHT: 70 IN | WEIGHT: 202 LBS | DIASTOLIC BLOOD PRESSURE: 90 MMHG | RESPIRATION RATE: 17 BRPM | BODY MASS INDEX: 28.92 KG/M2 | OXYGEN SATURATION: 100 %

## 2022-02-09 PROCEDURE — 99396 PREV VISIT EST AGE 40-64: CPT

## 2022-02-15 ENCOUNTER — LABORATORY RESULT (OUTPATIENT)
Age: 57
End: 2022-02-15

## 2022-02-16 LAB
ALBUMIN SERPL ELPH-MCNC: 4.9 G/DL
ALP BLD-CCNC: 61 U/L
ALT SERPL-CCNC: 44 U/L
ANION GAP SERPL CALC-SCNC: 14 MMOL/L
APPEARANCE: CLEAR
AST SERPL-CCNC: 33 U/L
BASOPHILS # BLD AUTO: 0.07 K/UL
BASOPHILS NFR BLD AUTO: 1 %
BILIRUB SERPL-MCNC: 0.5 MG/DL
BILIRUBIN URINE: NEGATIVE
BLOOD URINE: NEGATIVE
BUN SERPL-MCNC: 17 MG/DL
CALCIUM SERPL-MCNC: 9.4 MG/DL
CHLORIDE SERPL-SCNC: 101 MMOL/L
CHOLEST SERPL-MCNC: 164 MG/DL
CO2 SERPL-SCNC: 24 MMOL/L
COLOR: COLORLESS
CREAT SERPL-MCNC: 0.99 MG/DL
EOSINOPHIL # BLD AUTO: 0.1 K/UL
EOSINOPHIL NFR BLD AUTO: 1.4 %
ESTIMATED AVERAGE GLUCOSE: 114 MG/DL
GLUCOSE QUALITATIVE U: NEGATIVE
GLUCOSE SERPL-MCNC: 112 MG/DL
HBA1C MFR BLD HPLC: 5.6 %
HCT VFR BLD CALC: 42.7 %
HDLC SERPL-MCNC: 60 MG/DL
HGB BLD-MCNC: 15 G/DL
IMM GRANULOCYTES NFR BLD AUTO: 0.3 %
KETONES URINE: NEGATIVE
LDLC SERPL CALC-MCNC: 95 MG/DL
LEUKOCYTE ESTERASE URINE: NEGATIVE
LYMPHOCYTES # BLD AUTO: 1.41 K/UL
LYMPHOCYTES NFR BLD AUTO: 19.7 %
MAN DIFF?: NORMAL
MCHC RBC-ENTMCNC: 32.2 PG
MCHC RBC-ENTMCNC: 35.1 GM/DL
MCV RBC AUTO: 91.6 FL
MONOCYTES # BLD AUTO: 0.52 K/UL
MONOCYTES NFR BLD AUTO: 7.3 %
NEUTROPHILS # BLD AUTO: 5.04 K/UL
NEUTROPHILS NFR BLD AUTO: 70.3 %
NITRITE URINE: NEGATIVE
NONHDLC SERPL-MCNC: 104 MG/DL
PH URINE: 5.5
PLATELET # BLD AUTO: 246 K/UL
POTASSIUM SERPL-SCNC: 3.9 MMOL/L
PROT SERPL-MCNC: 7 G/DL
PROTEIN URINE: NEGATIVE
PSA SERPL-MCNC: 0.53 NG/ML
RBC # BLD: 4.66 M/UL
RBC # FLD: 12.3 %
SODIUM SERPL-SCNC: 139 MMOL/L
SPECIFIC GRAVITY URINE: 1
TRIGL SERPL-MCNC: 46 MG/DL
TSH SERPL-ACNC: 1.07 UIU/ML
UROBILINOGEN URINE: NORMAL
WBC # FLD AUTO: 7.16 K/UL

## 2022-02-22 ENCOUNTER — NON-APPOINTMENT (OUTPATIENT)
Age: 57
End: 2022-02-22

## 2022-02-22 ENCOUNTER — RX RENEWAL (OUTPATIENT)
Age: 57
End: 2022-02-22

## 2022-04-11 ENCOUNTER — APPOINTMENT (OUTPATIENT)
Dept: CARDIOLOGY | Facility: CLINIC | Age: 57
End: 2022-04-11
Payer: COMMERCIAL

## 2022-04-11 ENCOUNTER — NON-APPOINTMENT (OUTPATIENT)
Age: 57
End: 2022-04-11

## 2022-04-11 VITALS
HEIGHT: 70 IN | SYSTOLIC BLOOD PRESSURE: 172 MMHG | BODY MASS INDEX: 29.78 KG/M2 | OXYGEN SATURATION: 100 % | WEIGHT: 208 LBS | HEART RATE: 56 BPM | DIASTOLIC BLOOD PRESSURE: 94 MMHG

## 2022-04-11 PROBLEM — Z11.59 SCREENING FOR VIRAL DISEASE: Status: ACTIVE | Noted: 2020-12-22

## 2022-04-11 PROCEDURE — 99215 OFFICE O/P EST HI 40 MIN: CPT

## 2022-04-11 PROCEDURE — 93000 ELECTROCARDIOGRAM COMPLETE: CPT

## 2022-04-20 ENCOUNTER — APPOINTMENT (OUTPATIENT)
Dept: CARDIOLOGY | Facility: CLINIC | Age: 57
End: 2022-04-20
Payer: COMMERCIAL

## 2022-04-20 PROCEDURE — 93790 AMBL BP MNTR W/SW I&R: CPT

## 2022-06-01 ENCOUNTER — APPOINTMENT (OUTPATIENT)
Dept: CARDIOLOGY | Facility: CLINIC | Age: 57
End: 2022-06-01
Payer: COMMERCIAL

## 2022-06-01 ENCOUNTER — NON-APPOINTMENT (OUTPATIENT)
Age: 57
End: 2022-06-01

## 2022-06-01 VITALS
OXYGEN SATURATION: 97 % | DIASTOLIC BLOOD PRESSURE: 86 MMHG | HEIGHT: 70 IN | HEART RATE: 50 BPM | BODY MASS INDEX: 29.92 KG/M2 | WEIGHT: 209 LBS | SYSTOLIC BLOOD PRESSURE: 143 MMHG

## 2022-06-01 DIAGNOSIS — R00.1 BRADYCARDIA, UNSPECIFIED: ICD-10-CM

## 2022-06-01 PROCEDURE — 93000 ELECTROCARDIOGRAM COMPLETE: CPT

## 2022-06-01 PROCEDURE — 99215 OFFICE O/P EST HI 40 MIN: CPT

## 2022-06-02 ENCOUNTER — RX RENEWAL (OUTPATIENT)
Age: 57
End: 2022-06-02

## 2022-06-07 ENCOUNTER — APPOINTMENT (OUTPATIENT)
Dept: ORTHOPEDIC SURGERY | Facility: CLINIC | Age: 57
End: 2022-06-07
Payer: COMMERCIAL

## 2022-06-07 VITALS — WEIGHT: 205 LBS | BODY MASS INDEX: 29.35 KG/M2 | HEIGHT: 70 IN

## 2022-06-07 PROCEDURE — 99203 OFFICE O/P NEW LOW 30 MIN: CPT

## 2022-06-07 PROCEDURE — 72100 X-RAY EXAM L-S SPINE 2/3 VWS: CPT

## 2022-06-08 NOTE — ASSESSMENT
[FreeTextEntry1] : 55 y/o M with chronic low back pain without radicular component. PT rx given and muscle relaxants sent. Will order MRI if pain does not improve with PT. Will follow up in 4-6 weeks after first block of PT completed.

## 2022-06-08 NOTE — HISTORY OF PRESENT ILLNESS
[Upper back] : upper back [Mid-back] : mid-back [Lower back] : lower back [Gradual] : gradual [9] : 9 [2] : 2 [Radiating] : radiating [Sharp] : sharp [Stabbing] : stabbing [Constant] : constant [Rest] : rest [Standing] : standing [Walking] : walking [Bending forward] : bending forward [Extending back] : extending back [Stairs] : stairs [Part time] : Work status: part time [de-identified] : 57 y/o M presenting with low back pain X 4 years. States pain goes up the back to the mid back. No radicular pain down the leg. States swims, unable to walk long distance due to pain. States pain is getting worse.  Patient is on his feet all day and states working more is hurting his back.  Has not done PT, has not had an MRI. Not taking anything for the pain. 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

## 2022-06-08 NOTE — IMAGING
[de-identified] : General:\par Strength: \par Hip Flexors: 5/5 b/l\par Quadriceps: 5/5 b/l\par Dorsi/Plantor flexion: 5/5 b/l\par EHL: 5/5 b/l\par \par Sensation: Intact b/l\par Straight Leg Raise: Negative b/l\par

## 2022-06-22 ENCOUNTER — FORM ENCOUNTER (OUTPATIENT)
Age: 57
End: 2022-06-22

## 2022-07-19 ENCOUNTER — APPOINTMENT (OUTPATIENT)
Dept: ORTHOPEDIC SURGERY | Facility: CLINIC | Age: 57
End: 2022-07-19

## 2022-07-19 VITALS — HEIGHT: 70 IN | WEIGHT: 205 LBS | BODY MASS INDEX: 29.35 KG/M2

## 2022-07-19 DIAGNOSIS — M54.16 RADICULOPATHY, LUMBAR REGION: ICD-10-CM

## 2022-07-19 DIAGNOSIS — M48.061 SPINAL STENOSIS, LUMBAR REGION WITHOUT NEUROGENIC CLAUDICATION: ICD-10-CM

## 2022-07-19 PROCEDURE — 99213 OFFICE O/P EST LOW 20 MIN: CPT

## 2022-07-19 NOTE — HISTORY OF PRESENT ILLNESS
[Upper back] : upper back [Mid-back] : mid-back [Lower back] : lower back [Gradual] : gradual [9] : 9 [2] : 2 [Radiating] : radiating [Sharp] : sharp [Stabbing] : stabbing [Constant] : constant [Household chores] : household chores [Rest] : rest [Standing] : standing [Walking] : walking [Bending forward] : bending forward [Extending back] : extending back [Stairs] : stairs [Part time] : Work status: part time [de-identified] : 7/19/22: Here for follow up. Has been doing PT since last visit one month prior with minimal relieve. No change since last visit. No pain down the legs. Takes advil for pain when severe.  [] : Post Surgical Visit: no [FreeTextEntry9] : swimming [de-identified] : Dr. 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

## 2022-07-19 NOTE — ASSESSMENT
[FreeTextEntry1] : 55 y/o M presenting with chronic low back pain. No relieve with 4 weeks of therapy. Will get an MRI to further investigate and r/o HNP or nerve compression. Patient to follow up after MRI.

## 2022-07-19 NOTE — IMAGING
[de-identified] : General:\par Alignment: normal\par Spinous process: no tenderness\par Paraspinal tenderness: No tenderness\par Range of motion: full and pain free\par Scoliosis: none\par \par Strength: \par Hip Flexors: 5/5 b/l\par Quadriceps: 5/5 b/l\par Dorsi/Plantor flexion: 5/5 b/l\par EHL: 5/5 b/l\par \par Sensation: Intact b/l\par Straight Leg Raise: Negative b/l\par \par

## 2022-07-20 ENCOUNTER — NON-APPOINTMENT (OUTPATIENT)
Age: 57
End: 2022-07-20

## 2022-07-25 ENCOUNTER — FORM ENCOUNTER (OUTPATIENT)
Age: 57
End: 2022-07-25

## 2022-07-26 ENCOUNTER — APPOINTMENT (OUTPATIENT)
Dept: MRI IMAGING | Facility: CLINIC | Age: 57
End: 2022-07-26

## 2022-07-26 PROCEDURE — 72148 MRI LUMBAR SPINE W/O DYE: CPT

## 2022-07-29 ENCOUNTER — RX RENEWAL (OUTPATIENT)
Age: 57
End: 2022-07-29

## 2022-08-11 ENCOUNTER — APPOINTMENT (OUTPATIENT)
Dept: ORTHOPEDIC SURGERY | Facility: CLINIC | Age: 57
End: 2022-08-11

## 2022-08-11 VITALS — HEIGHT: 70 IN | BODY MASS INDEX: 29.35 KG/M2 | WEIGHT: 205 LBS

## 2022-08-11 PROCEDURE — 99214 OFFICE O/P EST MOD 30 MIN: CPT

## 2022-08-11 NOTE — ASSESSMENT
[FreeTextEntry1] : give the knee a few more weeks;  if this persists then should see  Dr Ponce or Dr Townsend for probable knee OA;\par \par went over MRI;  right 4-5 advanced arthrosis and edema correlates with his presentation;  as far as lumbar spine goes:  proposed right 4-5 facet / MBB and then right RFA PRN;\par the right 4-5 facet is very arthritic and there is reactive surrounding bone as a result;

## 2022-08-29 ENCOUNTER — APPOINTMENT (OUTPATIENT)
Dept: PAIN MANAGEMENT | Facility: CLINIC | Age: 57
End: 2022-08-29

## 2022-08-29 VITALS — BODY MASS INDEX: 28.63 KG/M2 | HEIGHT: 70 IN | WEIGHT: 200 LBS

## 2022-08-29 PROCEDURE — 99204 OFFICE O/P NEW MOD 45 MIN: CPT

## 2022-08-29 NOTE — HISTORY OF PRESENT ILLNESS
[5] : 5 [2] : 2 [Dull/Aching] : dull/aching [Radiating] : radiating [Sharp] : sharp [Constant] : constant [Rest] : rest [Standing] : standing [Walking] : walking [FreeTextEntry1] : Initial HPI 08/29/2022:\par Pain started a few months ago and is on the RIGHT side of the lower back and does NOT radiate described as a stabbing pain with NO associated numbness and tingling. Saw Dr. Lambert who recommended MBBs for Right L4-5 advanced arthrosis. \par \par L MRI \par Physical Therapy: Has done PT which made it worse \par Pain Medications: Advil \par Past Injections: none\par Spine surgery: none \par Blood thinners: none\par  [] : no

## 2022-08-29 NOTE — PHYSICAL EXAM
[de-identified] : Constitutional; Appears well, no apparent distress\par Ability to communicate: Normal \par Respiratory: non-labored breathing\par Skin: No rash noted\par Head: Normocephalic, atraumatic\par Neck: no visible thyroid enlargement\par Eyes: Extraocular movements intact\par Neurologic: Alert and oriented x3\par Psychiatric: normal mood, affect and behavior \par \par  [] : non-antalgic

## 2022-08-29 NOTE — DISCUSSION/SUMMARY
[de-identified] : After discussing various treatment options with the patient including but not limited to oral medications, physical therapy, exercise modalities as well as interventional spinal injections, we have decided with the following plan:\par \par - Continue Home exercises, stretching, activity modification, physical therapy, and conservative care.\par - MRI report and/or images was reviewed and discussed with the patient.\par - Recommend First Diagnostic RIGHT L3,4,5 Medial Branch Blocks under fluoroscopic guidance with image.\par - Patient presents with axial lumbar pain that has not responded to 3 months of conservative therapy including physical therapy or NSAID therapy.  The pain is interfering with activities of daily living and functionality. There is no radicular pain. The pain is exacerbated by facet loading / positive Kemps maneuver which is defined by pain reproduction with extension and rotation of the lumbar spine to the affected side.  The patient has not had a vertebral fusion at the levels of the proposed treatment.  There is no unexplained neurologic deficit.  There is no history of systemic infection, unstable medical condition, bleeding tendency, or local infection.  The injection is being performed to diagnose the facet joint as the source of the individual's pain, in preparation for a radiofrequency ablation. \par - The risks, benefits, contents and alternatives to injection were explained in full to the patient.  Risks outlined include but are not limited to infection, sepsis, bleeding, post-dural puncture headache, nerve damage, temporary increase in pain, syncopal episode, failure to resolve symptoms, allergic reaction, symptom recurrence, and elevation of blood sugar in diabetics. Cortisone may cause immunosuppression.  Patient understands the risks.  All questions were answered.  After discussion of options, patient requested an injection.  Information regarding the injection was given to the patient.  Which medications to stop prior to the injection was explained to the patient as well.\par - Follow up in 1-2 weeks post injection for re-evaluation.\par \par \par \par \par \par

## 2022-08-31 ENCOUNTER — APPOINTMENT (OUTPATIENT)
Dept: INTERNAL MEDICINE | Facility: CLINIC | Age: 57
End: 2022-08-31

## 2022-08-31 ENCOUNTER — LABORATORY RESULT (OUTPATIENT)
Age: 57
End: 2022-08-31

## 2022-08-31 VITALS
TEMPERATURE: 97.8 F | WEIGHT: 210 LBS | RESPIRATION RATE: 17 BRPM | HEIGHT: 70 IN | HEART RATE: 69 BPM | OXYGEN SATURATION: 100 % | DIASTOLIC BLOOD PRESSURE: 90 MMHG | BODY MASS INDEX: 30.06 KG/M2 | SYSTOLIC BLOOD PRESSURE: 140 MMHG
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
## 2022-08-31 PROCEDURE — 99214 OFFICE O/P EST MOD 30 MIN: CPT

## 2022-09-19 ENCOUNTER — APPOINTMENT (OUTPATIENT)
Dept: PAIN MANAGEMENT | Facility: CLINIC | Age: 57
End: 2022-09-19

## 2022-09-19 PROCEDURE — J3490M: CUSTOM

## 2022-09-19 PROCEDURE — 64493 INJ PARAVERT F JNT L/S 1 LEV: CPT | Mod: RT

## 2022-09-19 PROCEDURE — 64494 INJ PARAVERT F JNT L/S 2 LEV: CPT | Mod: RT,59

## 2022-09-19 NOTE — PROCEDURE
[FreeTextEntry3] : Date of Service: 09/19/2022 \par \par Account: [id number]\par \par Patient: JOHNATHON CHEUNG \par \par YOB: 1965\par \par Age: 56 year\par \par \par Surgeon:                                                         Rusty Webb D.O.\par \par Assistant:                                                        None\par \par Pre-Operative Diagnosis:                            Spondylosis of lumbar region without myelopathy or radiculopathy (M47.816)\par \par Post-Operative Diagnosis:                          Same\par \par Procedure:                                                     Right L3, L4, L5 Medial Branch Blocks \par \par Anesthesia:                                                    Local with Sedation and Nasal Oxygen\par \par \par This procedure was carried out using fluoroscopic guidance.  The risks and benefits of the procedure were discussed extensively with the patient.  The Consent of the patient was obtained and the following procedure was performed.\par \par The patient was placed in the prone position.  The patient's back was prepped and draped in a sterile fashion. A timeout was performed with all essential staff present and the site and side were verified. The lumbar vertebral bodies were identified and the fluoroscope right obliqued to approximately 30 degrees to reveal good "norman-dog" anatomical view.  The junction of the superior articulate process and transverse process at the L4, and L5 levels were identified and marked. The skin at these target points was then localized using 1 cc of 1% Lidocaine without epinephrine at each injection site.  A spinal needle was then introduced and advanced at these three levels to the above target points at the junction of the SAP and transverse processes until bone was contacted.  Fluoroscope then focused on the right sacral ala on AP view, and marked at this point.  The skin and subcutaneous structures were localized using 1 cc of 1.0 % lidocaine without epinephrine.  A spinal needle was then advanced under fluoroscopic guidance until bone was contacted at the ala.  After negative aspiration for heme and CSF, an injectate of 1.5 cc 0.25% bupivacaine plus 1.5 mg of betamethasone was injected at each of the four injection sites.\par \par The needles were then removed and pressure was applied.  Anesthesia personnel were present throughout the procedure.\par \par The patient was instructed to apply ice over the injection site for twenty minutes every two hours for the next 24 to 48 hours.  The patient was also instructed to contact me immediately if there were any problems.\par \par Rusty Webb D.O.\par

## 2022-10-03 ENCOUNTER — APPOINTMENT (OUTPATIENT)
Dept: PAIN MANAGEMENT | Facility: CLINIC | Age: 57
End: 2022-10-03

## 2022-10-03 VITALS — HEIGHT: 70 IN | WEIGHT: 210 LBS | BODY MASS INDEX: 30.06 KG/M2

## 2022-10-03 DIAGNOSIS — M47.816 SPONDYLOSIS W/OUT MYELOPATHY OR RADICULOPATHY, LUMBAR REGION: ICD-10-CM

## 2022-10-03 DIAGNOSIS — M54.50 LOW BACK PAIN, UNSPECIFIED: ICD-10-CM

## 2022-10-03 PROCEDURE — 99214 OFFICE O/P EST MOD 30 MIN: CPT

## 2022-10-03 NOTE — DISCUSSION/SUMMARY
[de-identified] : After discussing various treatment options with the patient including but not limited to oral medications, physical therapy, exercise modalities as well as interventional spinal injections, we have decided with the following plan:\par \par - Continue Home exercises, stretching, activity modification, physical therapy, and conservative care.\par - MRI report and/or images was reviewed and discussed with the patient.\par - Recommend Second Diagnostic RIGHT L3,4,5 Medial Branch Blocks under fluoroscopic guidance with image. First diagnostic MBBs resulted in >80% relief and improvement of ADLs for the duration of the local anesthetic \par - Patient presents with axial lumbar pain that has not responded to 3 months of conservative therapy including physical therapy or NSAID therapy.  The pain is interfering with activities of daily living and functionality. There is no radicular pain. The pain is exacerbated by facet loading / positive Kemps maneuver which is defined by pain reproduction with extension and rotation of the lumbar spine to the affected side.  The patient has not had a vertebral fusion at the levels of the proposed treatment.  There is no unexplained neurologic deficit.  There is no history of systemic infection, unstable medical condition, bleeding tendency, or local infection.  The injection is being performed to diagnose the facet joint as the source of the individual's pain, in preparation for a radiofrequency ablation. \par - The risks, benefits, contents and alternatives to injection were explained in full to the patient.  Risks outlined include but are not limited to infection, sepsis, bleeding, post-dural puncture headache, nerve damage, temporary increase in pain, syncopal episode, failure to resolve symptoms, allergic reaction, symptom recurrence, and elevation of blood sugar in diabetics. Cortisone may cause immunosuppression.  Patient understands the risks.  All questions were answered.  After discussion of options, patient requested an injection.  Information regarding the injection was given to the patient.  Which medications to stop prior to the injection was explained to the patient as well.\par - Follow up in 1-2 weeks post injection for re-evaluation.\par - Will call to schedule.\par \par \par \par \par \par

## 2022-10-03 NOTE — HISTORY OF PRESENT ILLNESS
[Lower back] : lower back [5] : 5 [2] : 2 [Dull/Aching] : dull/aching [Radiating] : radiating [Sharp] : sharp [Constant] : constant [Rest] : rest [Standing] : standing [Walking] : walking [FreeTextEntry1] : 10/03/22: s/p first diagnostic right L3,L4,L5 MBB on 919/2022 with 95% relief. Has been feeling reat since his injection. \par \par Initial HPI 08/29/2022:\par Pain started a few months ago and is on the RIGHT side of the lower back and does NOT radiate described as a stabbing pain with NO associated numbness and tingling. Saw Dr. Lambert who recommended MBBs for Right L4-5 advanced arthrosis. \par \par L MRI \par Physical Therapy: Has done PT which made it worse \par Pain Medications: Advil \par Past Injections: none\par Spine surgery: none \par Blood thinners: none\par  [] : no [TWNoteComboBox1] : 90%

## 2022-10-03 NOTE — REASON FOR VISIT
11:00 Hold PT EVALUATION AS PER GISELLE PRINCE

-------------------------------------------------------------------------------

Addendum: 09/10/19 at 1510 by JAYNE JAMES PT PT

-------------------------------------------------------------------------------

Amended: Links added. [Follow-Up Visit] : a follow-up pain management visit [FreeTextEntry2] : PP

## 2022-10-03 NOTE — PHYSICAL EXAM
[de-identified] : Constitutional; Appears well, no apparent distress\par Ability to communicate: Normal \par Respiratory: non-labored breathing\par Skin: No rash noted\par Head: Normocephalic, atraumatic\par Neck: no visible thyroid enlargement\par Eyes: Extraocular movements intact\par Neurologic: Alert and oriented x3\par Psychiatric: normal mood, affect and behavior \par \par  [] : non-antalgic

## 2022-10-04 ENCOUNTER — NON-APPOINTMENT (OUTPATIENT)
Age: 57
End: 2022-10-04

## 2022-10-04 ENCOUNTER — APPOINTMENT (OUTPATIENT)
Dept: CARDIOLOGY | Facility: CLINIC | Age: 57
End: 2022-10-04

## 2022-10-04 VITALS
SYSTOLIC BLOOD PRESSURE: 182 MMHG | HEIGHT: 70 IN | DIASTOLIC BLOOD PRESSURE: 90 MMHG | WEIGHT: 206 LBS | HEART RATE: 92 BPM | BODY MASS INDEX: 29.49 KG/M2 | OXYGEN SATURATION: 97 %

## 2022-10-04 DIAGNOSIS — H35.62 RETINAL HEMORRHAGE, LEFT EYE: ICD-10-CM

## 2022-10-04 PROCEDURE — 99215 OFFICE O/P EST HI 40 MIN: CPT | Mod: 25

## 2022-10-04 PROCEDURE — 93000 ELECTROCARDIOGRAM COMPLETE: CPT

## 2022-11-04 ENCOUNTER — LABORATORY RESULT (OUTPATIENT)
Age: 57
End: 2022-11-04

## 2022-11-04 ENCOUNTER — APPOINTMENT (OUTPATIENT)
Dept: CARDIOLOGY | Facility: CLINIC | Age: 57
End: 2022-11-04

## 2022-11-04 ENCOUNTER — NON-APPOINTMENT (OUTPATIENT)
Age: 57
End: 2022-11-04

## 2022-11-04 VITALS
SYSTOLIC BLOOD PRESSURE: 183 MMHG | DIASTOLIC BLOOD PRESSURE: 97 MMHG | HEIGHT: 70 IN | WEIGHT: 209 LBS | HEART RATE: 58 BPM | BODY MASS INDEX: 29.92 KG/M2 | OXYGEN SATURATION: 100 %

## 2022-11-04 DIAGNOSIS — A69.20 LYME DISEASE, UNSPECIFIED: ICD-10-CM

## 2022-11-04 LAB
ALBUMIN SERPL ELPH-MCNC: 4.7 G/DL
ALP BLD-CCNC: 61 U/L
ALT SERPL-CCNC: 40 U/L
ANION GAP SERPL CALC-SCNC: 12 MMOL/L
AST SERPL-CCNC: 27 U/L
BILIRUB SERPL-MCNC: 0.5 MG/DL
BUN SERPL-MCNC: 13 MG/DL
CALCIUM SERPL-MCNC: 9.3 MG/DL
CHLORIDE SERPL-SCNC: 99 MMOL/L
CHOLEST SERPL-MCNC: 137 MG/DL
CO2 SERPL-SCNC: 26 MMOL/L
CREAT SERPL-MCNC: 1 MG/DL
EGFR: 89 ML/MIN/1.73M2
GLUCOSE SERPL-MCNC: 113 MG/DL
HDLC SERPL-MCNC: 51 MG/DL
LDLC SERPL CALC-MCNC: 76 MG/DL
NONHDLC SERPL-MCNC: 86 MG/DL
POTASSIUM SERPL-SCNC: 3.6 MMOL/L
PROT SERPL-MCNC: 6.7 G/DL
SODIUM SERPL-SCNC: 136 MMOL/L
TRIGL SERPL-MCNC: 54 MG/DL
TSH SERPL-ACNC: 1.21 UIU/ML

## 2022-11-04 PROCEDURE — 99215 OFFICE O/P EST HI 40 MIN: CPT | Mod: 25

## 2022-11-04 PROCEDURE — 93000 ELECTROCARDIOGRAM COMPLETE: CPT

## 2022-11-05 LAB
ESTIMATED AVERAGE GLUCOSE: 114 MG/DL
HBA1C MFR BLD HPLC: 5.6 %

## 2022-11-21 ENCOUNTER — NON-APPOINTMENT (OUTPATIENT)
Age: 57
End: 2022-11-21

## 2022-11-21 ENCOUNTER — APPOINTMENT (OUTPATIENT)
Dept: ELECTROPHYSIOLOGY | Facility: CLINIC | Age: 57
End: 2022-11-21

## 2022-11-21 VITALS
WEIGHT: 208 LBS | HEIGHT: 70 IN | BODY MASS INDEX: 29.78 KG/M2 | OXYGEN SATURATION: 100 % | DIASTOLIC BLOOD PRESSURE: 95 MMHG | SYSTOLIC BLOOD PRESSURE: 170 MMHG | HEART RATE: 66 BPM

## 2022-11-21 PROCEDURE — 99205 OFFICE O/P NEW HI 60 MIN: CPT | Mod: 25

## 2022-11-21 PROCEDURE — 93000 ELECTROCARDIOGRAM COMPLETE: CPT

## 2022-11-21 NOTE — DISCUSSION/SUMMARY
[FreeTextEntry1] : In summary, this is a 56-year-old man with a history of [id number]-CHNn 1 paroxysmal atrial fibrillation despite escalating doses of beta-blockers. Options regarding management, including a rate control strategy with AV zelda blocking agents, or rhythm control strategies employing anti-arrhythmic drugs and/or catheter ablation were reviewed.  Given his does desire to avoid long-term medication therapy, including oral anticoagulation, I recommended he return for catheter ablation.  He will begin DOAC therapy now in anticipation of the procedure.\par \par The rationale for the procedure as well as its risks--including but not limited to bleeding, vascular injury, pericardial effusion/tamponade, heart block requiring pacemaker, stroke, and death--were reviewed in detail. After consideration of this information, the decision was made to proceed with the procedure.\par \par He appeared to understand the whole discussion and verbalized that all of his questions were answered to his satisfaction.\par \par Thank you for allowing me to be involved in the care of this pleasant man. Please feel free to contact me with any questions.\par \par \par \par Brennan Quinonez MD\par  of Cardiology\par Electrophysiology Section\par [address] [place] [address], [address] [place] [address]\[place] [address]\par Office: [phone]\par Fax: [phone]\par \par  [EKG obtained to assist in diagnosis and management of assessed problem(s)] : EKG obtained to assist in diagnosis and management of assessed problem(s)

## 2022-11-21 NOTE — HISTORY OF PRESENT ILLNESS
[FreeTextEntry1] : Mr. Mendoza Combs presents today to the cardiac electrophysiology clinic.  He is a 56-year-old man with a history of hypertension, hyperlipidemia and [id number] 1 paroxysmal atrial fibrillation.  He first began noticing symptoms of arrhythmia 2 to 3 years ago, at which point he began complaining of palpitations lasting 5 to 10 minutes and occurring once every several weeks.  Over the interceding 2 years, he has noticed an increase in symptom frequency and duration, now occurring at least 1-2 times per week and sometimes lasting upwards of 15 to 20 minutes.  During episodes he feels fatigued, dizzy with a rapid irregular heartbeat.  Symptoms continue despite Toprol 50 mg twice daily.  On therapy, he has noted increased fatigue and some sexual side effects.\par \par 2-week event monitoring showed paroxysmal atrial fibrillation and the burden of 4 to 5% with the longest episode lasting 10 to 12 minutes duration.  TTE (6/25/2020) showed a structurally normal heart.

## 2022-12-24 ENCOUNTER — NON-APPOINTMENT (OUTPATIENT)
Age: 57
End: 2022-12-24

## 2022-12-25 ENCOUNTER — TRANSCRIPTION ENCOUNTER (OUTPATIENT)
Age: 57
End: 2022-12-25

## 2022-12-28 ENCOUNTER — OUTPATIENT (OUTPATIENT)
Dept: OUTPATIENT SERVICES | Facility: HOSPITAL | Age: 57
LOS: 1 days | End: 2022-12-28
Payer: COMMERCIAL

## 2022-12-28 DIAGNOSIS — Z01.818 ENCOUNTER FOR OTHER PREPROCEDURAL EXAMINATION: ICD-10-CM

## 2022-12-28 DIAGNOSIS — Z20.822 CONTACT WITH AND (SUSPECTED) EXPOSURE TO COVID-19: ICD-10-CM

## 2022-12-28 DIAGNOSIS — Z11.52 ENCOUNTER FOR SCREENING FOR COVID-19: ICD-10-CM

## 2022-12-28 LAB — SARS-COV-2 RNA SPEC QL NAA+PROBE: DETECTED

## 2022-12-28 PROCEDURE — U0003: CPT

## 2022-12-28 PROCEDURE — U0005: CPT

## 2022-12-28 PROCEDURE — C9803: CPT

## 2023-01-09 LAB
ALBUMIN SERPL ELPH-MCNC: 4.5 G/DL
ALP BLD-CCNC: 67 U/L
ALT SERPL-CCNC: 45 U/L
ANION GAP SERPL CALC-SCNC: 12 MMOL/L
AST SERPL-CCNC: 26 U/L
BASOPHILS # BLD AUTO: 0.05 K/UL
BASOPHILS NFR BLD AUTO: 0.6 %
BILIRUB SERPL-MCNC: 0.5 MG/DL
BUN SERPL-MCNC: 14 MG/DL
CALCIUM SERPL-MCNC: 9.6 MG/DL
CHLORIDE SERPL-SCNC: 102 MMOL/L
CO2 SERPL-SCNC: 26 MMOL/L
CREAT SERPL-MCNC: 1.03 MG/DL
EGFR: 85 ML/MIN/1.73M2
EOSINOPHIL # BLD AUTO: 0.22 K/UL
EOSINOPHIL NFR BLD AUTO: 2.6 %
GLUCOSE SERPL-MCNC: 113 MG/DL
HCT VFR BLD CALC: 42.6 %
HGB BLD-MCNC: 15 G/DL
IMM GRANULOCYTES NFR BLD AUTO: 0.4 %
INR PPP: 1.08 RATIO
LYMPHOCYTES # BLD AUTO: 1.79 K/UL
LYMPHOCYTES NFR BLD AUTO: 21 %
MAN DIFF?: NORMAL
MCHC RBC-ENTMCNC: 32.6 PG
MCHC RBC-ENTMCNC: 35.2 GM/DL
MCV RBC AUTO: 92.6 FL
MONOCYTES # BLD AUTO: 0.92 K/UL
MONOCYTES NFR BLD AUTO: 10.8 %
NEUTROPHILS # BLD AUTO: 5.51 K/UL
NEUTROPHILS NFR BLD AUTO: 64.6 %
PLATELET # BLD AUTO: 315 K/UL
POTASSIUM SERPL-SCNC: 4.1 MMOL/L
PROT SERPL-MCNC: 6.6 G/DL
PT BLD: 12.6 SEC
RBC # BLD: 4.6 M/UL
RBC # FLD: 12.8 %
SARS-COV-2 N GENE NPH QL NAA+PROBE: DETECTED
SODIUM SERPL-SCNC: 139 MMOL/L
WBC # FLD AUTO: 8.52 K/UL

## 2023-01-10 ENCOUNTER — OUTPATIENT (OUTPATIENT)
Dept: INPATIENT UNIT | Facility: HOSPITAL | Age: 58
LOS: 1 days | End: 2023-01-10
Payer: COMMERCIAL

## 2023-01-10 ENCOUNTER — TRANSCRIPTION ENCOUNTER (OUTPATIENT)
Age: 58
End: 2023-01-10

## 2023-01-10 VITALS
HEART RATE: 72 BPM | OXYGEN SATURATION: 98 % | DIASTOLIC BLOOD PRESSURE: 78 MMHG | RESPIRATION RATE: 16 BRPM | TEMPERATURE: 98 F | SYSTOLIC BLOOD PRESSURE: 121 MMHG

## 2023-01-10 VITALS
HEIGHT: 70 IN | TEMPERATURE: 98 F | SYSTOLIC BLOOD PRESSURE: 155 MMHG | DIASTOLIC BLOOD PRESSURE: 86 MMHG | RESPIRATION RATE: 17 BRPM | WEIGHT: 199.96 LBS | HEART RATE: 78 BPM

## 2023-01-10 DIAGNOSIS — I48.0 PAROXYSMAL ATRIAL FIBRILLATION: ICD-10-CM

## 2023-01-10 LAB
BLD GP AB SCN SERPL QL: NEGATIVE — SIGNIFICANT CHANGE UP
RH IG SCN BLD-IMP: POSITIVE — SIGNIFICANT CHANGE UP

## 2023-01-10 PROCEDURE — 86850 RBC ANTIBODY SCREEN: CPT

## 2023-01-10 PROCEDURE — C1889: CPT

## 2023-01-10 PROCEDURE — C1759: CPT

## 2023-01-10 PROCEDURE — 86900 BLOOD TYPING SEROLOGIC ABO: CPT

## 2023-01-10 PROCEDURE — C1732: CPT

## 2023-01-10 PROCEDURE — 93656 COMPRE EP EVAL ABLTJ ATR FIB: CPT

## 2023-01-10 PROCEDURE — C1766: CPT

## 2023-01-10 PROCEDURE — C1730: CPT

## 2023-01-10 PROCEDURE — C1760: CPT

## 2023-01-10 PROCEDURE — 93005 ELECTROCARDIOGRAM TRACING: CPT

## 2023-01-10 PROCEDURE — 93623 PRGRMD STIMJ&PACG IV RX NFS: CPT

## 2023-01-10 PROCEDURE — 86901 BLOOD TYPING SEROLOGIC RH(D): CPT

## 2023-01-10 PROCEDURE — C1894: CPT

## 2023-01-10 PROCEDURE — 93657 TX L/R ATRIAL FIB ADDL: CPT

## 2023-01-10 RX ORDER — APIXABAN 2.5 MG/1
5 TABLET, FILM COATED ORAL ONCE
Refills: 0 | Status: COMPLETED | OUTPATIENT
Start: 2023-01-10 | End: 2023-01-10

## 2023-01-10 RX ORDER — PANTOPRAZOLE SODIUM 20 MG/1
1 TABLET, DELAYED RELEASE ORAL
Qty: 30 | Refills: 0
Start: 2023-01-10 | End: 2023-02-08

## 2023-01-10 RX ADMIN — APIXABAN 5 MILLIGRAM(S): 2.5 TABLET, FILM COATED ORAL at 16:11

## 2023-01-10 NOTE — ASU DISCHARGE PLAN (ADULT/PEDIATRIC) - NS MD DC FALL RISK RISK
For information on Fall & Injury Prevention, visit: [email or web address] OR  [email or web address] OR  https://www.cdc.gov/steadi/patient.html

## 2023-01-10 NOTE — H&P CARDIOLOGY - HISTORY OF PRESENT ILLNESS
56-year-old man with a history of hypertension, hyperlipidemia and [id number] 1 paroxysmal atrial fibrillation, on Eliquis and metoprolol, presents today for AF ablation. He first began noticing symptoms of arrhythmia 2 to 3 years ago, at which point he began complaining of palpitations lasting 5 to 10 minutes and occurring once every several weeks. Over the interceding 2 years, he has noticed an increase in symptom frequency and duration, now occurring at least 1-2 times per week and sometimes lasting upwards of 15 to 20 minutes. During episodes he feels fatigued, dizzy with a rapid irregular heartbeat. Symptoms continue despite Toprol 50 mg twice daily. Patient was seen and evaluated by Dr. Quinonez and recommends for AF ablation. Denies any implanted cardiac device.  Patient had COVID-19 infection on Dec 25th 2022.       56-year-old man with a history of hypertension, hyperlipidemia and PSY8NN4-SVOp 1 paroxysmal atrial fibrillation, on Eliquis and metoprolol, presents today for AF ablation. He first began noticing symptoms of arrhythmia 2 to 3 years ago, at which point he began complaining of palpitations lasting 5 to 10 minutes and occurring once every several weeks. Then he has noticed an increase in symptom frequency and duration, now occurring at least 1-2 times per week and sometimes lasting upwards of 15 to 20 minutes. During episodes he feels fatigued, dizzy with a rapid irregular heartbeat. Symptoms continue despite Toprol 50 mg twice daily. Patient was seen and evaluated by Dr. Quinonez and recommends for AF ablation. Denies any implanted cardiac device.  Patient had COVID-19 infection on Dec 25th 2022.       56-year-old man with a history of hypertension, hyperlipidemia and paroxysmal atrial fibrillation, on Eliquis and metoprolol, presents today for AF ablation. He first began noticing symptoms of arrhythmia 2 to 3 years ago, at which point he began complaining of palpitations lasting 5 to 10 minutes and occurring once every several weeks. Then he has noticed an increase in symptom frequency and duration, now occurring at least 1-2 times per week and sometimes lasting upwards of 15 to 20 minutes. During episodes he feels fatigued, dizzy with a rapid irregular heartbeat. Symptoms continue despite Toprol 50 mg twice daily. Patient was seen and evaluated by Dr. Quinonez and recommends for AF ablation. Denies any implanted cardiac device.  Patient had COVID-19 infection on Dec 25th 2022.

## 2023-01-10 NOTE — H&P CARDIOLOGY - NSICDXPASTMEDICALHX_GEN_ALL_CORE_FT
PAST MEDICAL HISTORY:  Atrial fibrillation     Benign essential HTN     COVID-19     HLD (hyperlipidemia)

## 2023-01-10 NOTE — PRE-ANESTHESIA EVALUATION ADULT - NSANTHOSAYNRD_GEN_A_CORE
No. AFIA screening performed.  STOP BANG Legend: 0-2 = LOW Risk; 3-4 = INTERMEDIATE Risk; 5-8 = HIGH Risk

## 2023-01-10 NOTE — CHART NOTE - NSCHARTNOTEFT_GEN_A_CORE
56 yo M s/p Afib ablation via RFV Vescade closure   RFV site Benign, VSS  Pt is alert and oriented x 3  No c/o pain, SOB   Plan:   - BR till 15:00  - Eliquis @ 16:00, resume all medications   - D/C home @18:00   - f/u on 2/22 10:30 am

## 2023-01-10 NOTE — ASU DISCHARGE PLAN (ADULT/PEDIATRIC) - ASU DC SPECIAL INSTRUCTIONSFT
WOUND CARE:  The day AFTER your procedure  - Remove the bandage at the site GENTLY, clean with mild soap and water, and pat dry; leave open to air  - You may shower   - DO NOT apply lotions, creams, ointments, powder, perfumes to your incision site  -Check your groin every day. A small amount of bruising or soreness is normal, a bump ( smaller than nickel) might be present, normal  - DO NOT SOAK your site for 1 week ( no baths, no pools, no tubs, etc..)    ACTIVITY:  Your procedure was done through your groin  for the next 5 DAYS:  - Limit climbing stairs, no strenuous activity, pushing , pulling, or straining   DO NOT LIFT anything 10 lbs or heavier   you may resume sexual activity in 7 days, unless instructed otherwise  mild palpitations are normal     Follow heart healthy diet recommended by your doctor, , if you smoke STOP SMOKING ( may call [phone] for center of tobacco control if you need assistance).  for the next 24 hours:   - stay at home and rest, do not drive or operate heavy machinery   do not drink alcoholic beverages   do not make important personal or business decisions     ***CALL YOUR DOCTOR ***  IF you have fever, chills, body aches, or severe pain, swelling, redness, heat, yellow drainage from your incision site  IF bleeding  or significant new swelling from your puncture site  IF you experience rapid heartbeat or palpitations that cause: lightheadedness, dizziness, or fainting spell.  If you experience difficulty swallowing, or pain with swallowing   IF unable to get in contact with your doctor, you may call the Cardiology Office at [facility] at [phone]

## 2023-01-10 NOTE — ASU DISCHARGE PLAN (ADULT/PEDIATRIC) - CARE PROVIDER_API CALL
Brennan Quinonez)  Cardiac Electrophysiology; Cardiology  [address] [place] [address]  Phone: [phone]  Fax: [phone]  Scheduled Appointment: 02/22/2023 10:30 AM

## 2023-01-10 NOTE — ASU DISCHARGE PLAN (ADULT/PEDIATRIC) - ACTIVITY LEVEL
See my prior notes; review of systems, family history and social history are   unchanged.    HISTORY OF PRESENT ILLNESS:  A 75-year-old male status post:  1.  Antegrade approach angioplasty, left AV fistula, 01/22/2018.  2.  Left brachiocephalic AV fistula creation, 11/29/2017.  3.  Distant history of left radial artery harvest for CABG 12 years ago.    He now returns.  He is dialyzing via PermCath.  He is without complaint.    PHYSICAL EXAMINATION:  VITAL SIGNS:  See nursing note.  EXTREMITIES:  Left arm shows a soft thrill in the fistula better than prior.    Subjectively, it is fairly well matured.    IMAGING:  Duplex shows:  1.  Resolution of the proximal stenosis.  2.  Flow volume now 1.5 liters, markedly increased from prior when it was 456 mL   per minute.  3.  Diameter is 6.8 in the mid area with a depth of 3.5.    ASSESSMENT:  Adequately matured AV fistula after treatment of inflow stenosis.    RECOMMENDATIONS:  1.  May cannulate.  2.  Follow up with Nephrology and Vascular Service in two to three weeks for   PermCath removal if using the AV fistula exclusively.  3.  Follow up with me in three months with the AV fistula duplex if clinically   indicated.      BRODIE/GRISELDA  dd: 01/30/2018 15:09:03 (CST)  td: 01/31/2018 08:56:57 (CST)  Doc ID   #[id number]  Job ID #[id number]    CC:        No excercise/No heavy lifting/No sports/gym/No weight bearing

## 2023-01-10 NOTE — PRE-ANESTHESIA EVALUATION ADULT - NSANTHPMHFT_GEN_ALL_CORE
56-year-old man with a history of hypertension, hyperlipidemia and paroxysmal atrial fibrillation, on Eliquis and metoprolol, presents today for AF ablation. He first began noticing symptoms of arrhythmia 2 to 3 years ago, at which point he began complaining of palpitations lasting 5 to 10 minutes and occurring once every several weeks. Then he has noticed an increase in symptom frequency and duration, now occurring at least 1-2 times per week and sometimes lasting upwards of 15 to 20 minutes. During episodes he feels fatigued, dizzy with a rapid irregular heartbeat. Symptoms continue despite Toprol 50 mg twice daily. Patient was seen and evaluated by Dr. Quinonez and recommends for AF ablation. Denies any implanted cardiac device.  Patient had COVID-19 infection on Dec 25th 2022.

## 2023-01-11 ENCOUNTER — NON-APPOINTMENT (OUTPATIENT)
Age: 58
End: 2023-01-11

## 2023-01-12 RX ORDER — APIXABAN 2.5 MG/1
1 TABLET, FILM COATED ORAL
Qty: 0 | Refills: 0 | DISCHARGE

## 2023-01-12 RX ORDER — EZETIMIBE 10 MG/1
1 TABLET ORAL
Qty: 0 | Refills: 0 | DISCHARGE

## 2023-01-12 RX ORDER — ROSUVASTATIN CALCIUM 5 MG/1
1 TABLET ORAL
Qty: 0 | Refills: 0 | DISCHARGE

## 2023-01-12 RX ORDER — IRBESARTAN 75 MG/1
1 TABLET ORAL
Qty: 0 | Refills: 0 | DISCHARGE

## 2023-01-12 RX ORDER — HYDROCHLOROTHIAZIDE 25 MG
1 TABLET ORAL
Qty: 0 | Refills: 0 | DISCHARGE

## 2023-01-12 RX ORDER — METOPROLOL TARTRATE 50 MG
1 TABLET ORAL
Qty: 0 | Refills: 0 | DISCHARGE

## 2023-01-12 RX ORDER — AMLODIPINE BESYLATE 2.5 MG/1
1 TABLET ORAL
Qty: 0 | Refills: 0 | DISCHARGE

## 2023-01-12 NOTE — PRE-OP CHECKLIST - TEMPERATURE IN FAHRENHEIT (DEGREES F)
CERTIFICATE OF RETURN TO WORK    January 12, 2023      Re:   S[employer] Mercedes  [address] [place] [address]                        This is to certify that Rodger Rico has been under my care from 1/10/23. Please excuse the patient from work on 01/13/23 and 1/14/23. Patient may return to work on 1/15/23. RESTRICTIONS:       REMARKS:         SIGNATURE:___________________________________________       Aaliyah Yang.  Angela Verdugo M.D.     [address], [address] [place] [address]      [phone] 98.5

## 2023-01-25 ENCOUNTER — RX RENEWAL (OUTPATIENT)
Age: 58
End: 2023-01-25

## 2023-01-27 PROBLEM — I10 ESSENTIAL (PRIMARY) HYPERTENSION: Chronic | Status: ACTIVE | Noted: 2023-01-10

## 2023-01-27 PROBLEM — U07.1 COVID-19: Chronic | Status: ACTIVE | Noted: 2023-01-10

## 2023-01-27 PROBLEM — E78.5 HYPERLIPIDEMIA, UNSPECIFIED: Chronic | Status: ACTIVE | Noted: 2023-01-10

## 2023-01-27 PROBLEM — I48.91 UNSPECIFIED ATRIAL FIBRILLATION: Chronic | Status: ACTIVE | Noted: 2023-01-10

## 2023-02-01 ENCOUNTER — NON-APPOINTMENT (OUTPATIENT)
Age: 58
End: 2023-02-01

## 2023-02-01 ENCOUNTER — APPOINTMENT (OUTPATIENT)
Dept: CARDIOLOGY | Facility: CLINIC | Age: 58
End: 2023-02-01
Payer: COMMERCIAL

## 2023-02-01 VITALS
SYSTOLIC BLOOD PRESSURE: 153 MMHG | WEIGHT: 203 LBS | BODY MASS INDEX: 29.06 KG/M2 | HEART RATE: 53 BPM | DIASTOLIC BLOOD PRESSURE: 94 MMHG | HEIGHT: 70 IN | OXYGEN SATURATION: 97 %

## 2023-02-01 PROCEDURE — 93000 ELECTROCARDIOGRAM COMPLETE: CPT

## 2023-02-01 PROCEDURE — 99215 OFFICE O/P EST HI 40 MIN: CPT | Mod: 25

## 2023-02-08 ENCOUNTER — APPOINTMENT (OUTPATIENT)
Dept: CARDIOLOGY | Facility: CLINIC | Age: 58
End: 2023-02-08
Payer: COMMERCIAL

## 2023-02-08 PROCEDURE — 93306 TTE W/DOPPLER COMPLETE: CPT

## 2023-02-22 ENCOUNTER — NON-APPOINTMENT (OUTPATIENT)
Age: 58
End: 2023-02-22

## 2023-02-22 ENCOUNTER — APPOINTMENT (OUTPATIENT)
Dept: ELECTROPHYSIOLOGY | Facility: CLINIC | Age: 58
End: 2023-02-22
Payer: COMMERCIAL

## 2023-02-22 ENCOUNTER — APPOINTMENT (OUTPATIENT)
Dept: ELECTROPHYSIOLOGY | Facility: CLINIC | Age: 58
End: 2023-02-22

## 2023-02-22 VITALS
SYSTOLIC BLOOD PRESSURE: 142 MMHG | DIASTOLIC BLOOD PRESSURE: 87 MMHG | OXYGEN SATURATION: 99 % | HEART RATE: 58 BPM | HEIGHT: 70 IN | BODY MASS INDEX: 29.35 KG/M2 | WEIGHT: 205 LBS

## 2023-02-22 PROCEDURE — 99214 OFFICE O/P EST MOD 30 MIN: CPT | Mod: 25

## 2023-02-22 PROCEDURE — 93000 ELECTROCARDIOGRAM COMPLETE: CPT

## 2023-02-23 NOTE — HISTORY OF PRESENT ILLNESS
[FreeTextEntry1] : Mr. Mendoza Combs presents today to the cardiac electrophysiology clinic.  He is a 56-year-old man with a history of hypertension, hyperlipidemia and several years of symptomatic [id number]-PHXk 1 paroxysmal atrial fibrillation with rising symptom burden.  On 1/10/23 he returned and underwent pulmonary vein isolation and cavotricuspid isthmus ablation. Left atrial voltage mapping in sinus rhythm revealed an otherwise healthy chamber. He was discharged home without event and returns now for follow up.\par \par He notes resolution of palpitations. He denies any incidence of chest pain, SOB, dysphagia, unexplained fever, dizziness or syncope. He notes normal access site healing.\par \par  TTE (6/25/2020) showed a structurally normal heart.

## 2023-02-23 NOTE — DISCUSSION/SUMMARY
[FreeTextEntry1] : In summary, this is a 57-year-old man with a history of [id number]-USLv 1 paroxysmal atrial fibrillation now s/p ablation. I'm pleased to see him doing well today with resolution of symptoms. He will be sent an event monitor in 2 months and return for follow up in 6 months. He will discontinue anti-coagulation if he has no recurrence of arrhythmia at 6 mo follow up.\par \par He appeared to understand the whole discussion and verbalized that all of his questions were answered to his satisfaction.\par \par Thank you for allowing me to be involved in the care of this pleasant man. Please feel free to contact me with any questions.\par \par \par \par Brennan Quinonez MD\par  of Cardiology\par Electrophysiology Section\[address]\[place] [address]\par Office: [phone]\par Fax: [phone]\par \par  [EKG obtained to assist in diagnosis and management of assessed problem(s)] : EKG obtained to assist in diagnosis and management of assessed problem(s)

## 2023-03-02 ENCOUNTER — RX RENEWAL (OUTPATIENT)
Age: 58
End: 2023-03-02

## 2023-03-27 ENCOUNTER — APPOINTMENT (OUTPATIENT)
Dept: INTERNAL MEDICINE | Facility: CLINIC | Age: 58
End: 2023-03-27
Payer: COMMERCIAL

## 2023-03-27 VITALS
HEIGHT: 70 IN | HEART RATE: 60 BPM | TEMPERATURE: 97.2 F | OXYGEN SATURATION: 98 % | SYSTOLIC BLOOD PRESSURE: 150 MMHG | BODY MASS INDEX: 30.35 KG/M2 | DIASTOLIC BLOOD PRESSURE: 90 MMHG | WEIGHT: 212 LBS

## 2023-03-27 DIAGNOSIS — Z12.83 ENCOUNTER FOR SCREENING FOR MALIGNANT NEOPLASM OF SKIN: ICD-10-CM

## 2023-03-27 PROCEDURE — 99396 PREV VISIT EST AGE 40-64: CPT

## 2023-03-27 RX ORDER — AMLODIPINE BESYLATE 2.5 MG/1
2.5 TABLET ORAL DAILY
Qty: 90 | Refills: 3 | Status: DISCONTINUED | COMMUNITY
Start: 2022-11-04 | End: 2023-03-27

## 2023-03-27 RX ORDER — TIZANIDINE HYDROCHLORIDE 4 MG/1
4 CAPSULE ORAL
Qty: 30 | Refills: 0 | Status: DISCONTINUED | COMMUNITY
Start: 2022-06-07 | End: 2023-03-27

## 2023-03-27 RX ORDER — IPRATROPIUM BROMIDE 42 UG/1
0.06 SPRAY NASAL
Qty: 15 | Refills: 0 | Status: DISCONTINUED | COMMUNITY
Start: 2021-12-17 | End: 2023-03-27

## 2023-03-30 ENCOUNTER — LABORATORY RESULT (OUTPATIENT)
Age: 58
End: 2023-03-30

## 2023-04-02 LAB
ALBUMIN SERPL ELPH-MCNC: 5 G/DL
ALP BLD-CCNC: 67 U/L
ALT SERPL-CCNC: 36 U/L
ANION GAP SERPL CALC-SCNC: 14 MMOL/L
APPEARANCE: CLEAR
AST SERPL-CCNC: 27 U/L
BASOPHILS # BLD AUTO: 0.06 K/UL
BASOPHILS NFR BLD AUTO: 0.8 %
BILIRUB SERPL-MCNC: 0.5 MG/DL
BILIRUBIN URINE: NEGATIVE
BLOOD URINE: NEGATIVE
BUN SERPL-MCNC: 17 MG/DL
CALCIUM SERPL-MCNC: 9.6 MG/DL
CHLORIDE SERPL-SCNC: 101 MMOL/L
CHOLEST SERPL-MCNC: 152 MG/DL
CO2 SERPL-SCNC: 25 MMOL/L
COLOR: COLORLESS
CREAT SERPL-MCNC: 0.99 MG/DL
EGFR: 89 ML/MIN/1.73M2
EOSINOPHIL # BLD AUTO: 0.08 K/UL
EOSINOPHIL NFR BLD AUTO: 1.1 %
ESTIMATED AVERAGE GLUCOSE: 114 MG/DL
GLUCOSE QUALITATIVE U: NEGATIVE
GLUCOSE SERPL-MCNC: 94 MG/DL
HBA1C MFR BLD HPLC: 5.6 %
HCT VFR BLD CALC: 44.5 %
HDLC SERPL-MCNC: 61 MG/DL
HGB BLD-MCNC: 15.2 G/DL
IMM GRANULOCYTES NFR BLD AUTO: 0.4 %
KETONES URINE: NEGATIVE
LDLC SERPL CALC-MCNC: 80 MG/DL
LEUKOCYTE ESTERASE URINE: NEGATIVE
LYMPHOCYTES # BLD AUTO: 1.35 K/UL
LYMPHOCYTES NFR BLD AUTO: 19 %
MAN DIFF?: NORMAL
MCHC RBC-ENTMCNC: 31.5 PG
MCHC RBC-ENTMCNC: 34.2 GM/DL
MCV RBC AUTO: 92.3 FL
MONOCYTES # BLD AUTO: 0.47 K/UL
MONOCYTES NFR BLD AUTO: 6.6 %
NEUTROPHILS # BLD AUTO: 5.1 K/UL
NEUTROPHILS NFR BLD AUTO: 72.1 %
NITRITE URINE: NEGATIVE
NONHDLC SERPL-MCNC: 91 MG/DL
PH URINE: 6
PLATELET # BLD AUTO: 242 K/UL
POTASSIUM SERPL-SCNC: 3.8 MMOL/L
PROT SERPL-MCNC: 6.9 G/DL
PROTEIN URINE: NEGATIVE
PSA SERPL-MCNC: 0.51 NG/ML
RBC # BLD: 4.82 M/UL
RBC # FLD: 12.3 %
SODIUM SERPL-SCNC: 141 MMOL/L
SPECIFIC GRAVITY URINE: 1.01
TRIGL SERPL-MCNC: 55 MG/DL
TSH SERPL-ACNC: 1.6 UIU/ML
UROBILINOGEN URINE: NORMAL
WBC # FLD AUTO: 7.09 K/UL

## 2023-04-13 ENCOUNTER — FORM ENCOUNTER (OUTPATIENT)
Age: 58
End: 2023-04-13

## 2023-05-05 PROCEDURE — 93248 EXT ECG>7D<15D REV&INTERPJ: CPT

## 2023-05-23 ENCOUNTER — NON-APPOINTMENT (OUTPATIENT)
Age: 58
End: 2023-05-23

## 2023-05-24 ENCOUNTER — APPOINTMENT (OUTPATIENT)
Dept: DERMATOLOGY | Facility: CLINIC | Age: 58
End: 2023-05-24
Payer: COMMERCIAL

## 2023-05-24 VITALS — HEIGHT: 70 IN | BODY MASS INDEX: 28.63 KG/M2 | WEIGHT: 200 LBS

## 2023-05-24 DIAGNOSIS — D48.5 NEOPLASM OF UNCERTAIN BEHAVIOR OF SKIN: ICD-10-CM

## 2023-05-24 DIAGNOSIS — Z12.83 ENCOUNTER FOR SCREENING FOR MALIGNANT NEOPLASM OF SKIN: ICD-10-CM

## 2023-05-24 DIAGNOSIS — D22.9 MELANOCYTIC NEVI, UNSPECIFIED: ICD-10-CM

## 2023-05-24 DIAGNOSIS — L82.1 OTHER SEBORRHEIC KERATOSIS: ICD-10-CM

## 2023-05-24 DIAGNOSIS — L57.0 ACTINIC KERATOSIS: ICD-10-CM

## 2023-05-24 PROCEDURE — 11103 TANGNTL BX SKIN EA SEP/ADDL: CPT | Mod: 59

## 2023-05-24 PROCEDURE — 11102 TANGNTL BX SKIN SINGLE LES: CPT | Mod: 59

## 2023-05-24 PROCEDURE — 99203 OFFICE O/P NEW LOW 30 MIN: CPT | Mod: 25

## 2023-05-24 PROCEDURE — 17000 DESTRUCT PREMALG LESION: CPT | Mod: 59

## 2023-06-01 ENCOUNTER — RX RENEWAL (OUTPATIENT)
Age: 58
End: 2023-06-01

## 2023-06-06 ENCOUNTER — APPOINTMENT (OUTPATIENT)
Dept: CARDIOLOGY | Facility: CLINIC | Age: 58
End: 2023-06-06
Payer: COMMERCIAL

## 2023-06-06 ENCOUNTER — NON-APPOINTMENT (OUTPATIENT)
Age: 58
End: 2023-06-06

## 2023-06-06 VITALS
BODY MASS INDEX: 29.78 KG/M2 | SYSTOLIC BLOOD PRESSURE: 174 MMHG | WEIGHT: 208 LBS | DIASTOLIC BLOOD PRESSURE: 97 MMHG | HEIGHT: 70 IN | HEART RATE: 61 BPM | OXYGEN SATURATION: 99 %

## 2023-06-06 PROCEDURE — 93000 ELECTROCARDIOGRAM COMPLETE: CPT

## 2023-06-06 PROCEDURE — 99215 OFFICE O/P EST HI 40 MIN: CPT | Mod: 25

## 2023-06-06 RX ORDER — APIXABAN 5 MG/1
5 TABLET, FILM COATED ORAL
Qty: 60 | Refills: 5 | Status: DISCONTINUED | COMMUNITY
Start: 2022-11-21 | End: 2023-06-06

## 2023-06-07 ENCOUNTER — NON-APPOINTMENT (OUTPATIENT)
Age: 58
End: 2023-06-07

## 2023-06-08 LAB — DERMATOLOGY BIOPSY: NORMAL

## 2023-06-09 ENCOUNTER — NON-APPOINTMENT (OUTPATIENT)
Age: 58
End: 2023-06-09

## 2023-06-20 ENCOUNTER — APPOINTMENT (OUTPATIENT)
Dept: PULMONOLOGY | Facility: CLINIC | Age: 58
End: 2023-06-20
Payer: COMMERCIAL

## 2023-06-20 VITALS — HEART RATE: 74 BPM | SYSTOLIC BLOOD PRESSURE: 161 MMHG | OXYGEN SATURATION: 97 % | DIASTOLIC BLOOD PRESSURE: 84 MMHG

## 2023-06-20 DIAGNOSIS — R06.83 SNORING: ICD-10-CM

## 2023-06-20 PROCEDURE — 99203 OFFICE O/P NEW LOW 30 MIN: CPT

## 2023-06-20 NOTE — CONSULT LETTER
[FreeTextEntry1] : Dear ,\par \par I had the pleasure of evaluating your patient, JOHNATHON CHEUNG today in pulmonary consultation.  Please refer to my attached note for my findings and recommendations.\par \par \par Thank you for allowing me to participate in the care of your patient, please feel free to call with any questions or concerns.\par \par \par Sincerely,\par \par Anat Arambula MD\par [facility] \par [facility]\par \par

## 2023-06-20 NOTE — HISTORY OF PRESENT ILLNESS
[Never] : never [TextBox_4] : 57M\par \par reports snoring, witnessed apneas, uncontrolled HTN, some days very tired.  Here for AFIA evaluation.  No prior pulm hx.  Swims 4x/week without issue.

## 2023-06-27 ENCOUNTER — APPOINTMENT (OUTPATIENT)
Dept: PULMONOLOGY | Facility: CLINIC | Age: 58
End: 2023-06-27
Payer: COMMERCIAL

## 2023-06-27 PROCEDURE — 95800 SLP STDY UNATTENDED: CPT

## 2023-06-28 PROCEDURE — 95800 SLP STDY UNATTENDED: CPT

## 2023-07-19 ENCOUNTER — APPOINTMENT (OUTPATIENT)
Dept: PULMONOLOGY | Facility: CLINIC | Age: 58
End: 2023-07-19
Payer: COMMERCIAL

## 2023-07-19 VITALS
RESPIRATION RATE: 16 BRPM | DIASTOLIC BLOOD PRESSURE: 84 MMHG | SYSTOLIC BLOOD PRESSURE: 148 MMHG | OXYGEN SATURATION: 97 % | HEART RATE: 60 BPM

## 2023-07-19 PROCEDURE — 99213 OFFICE O/P EST LOW 20 MIN: CPT

## 2023-07-19 NOTE — HISTORY OF PRESENT ILLNESS
[TextBox_4] : pt with difficult to control htn\par here to fu sleep testing\par has severe tova, ahi 58 with desaturation to 60's

## 2023-07-25 ENCOUNTER — NON-APPOINTMENT (OUTPATIENT)
Age: 58
End: 2023-07-25

## 2023-07-25 ENCOUNTER — APPOINTMENT (OUTPATIENT)
Dept: CARDIOLOGY | Facility: CLINIC | Age: 58
End: 2023-07-25
Payer: COMMERCIAL

## 2023-07-25 VITALS
BODY MASS INDEX: 29.63 KG/M2 | DIASTOLIC BLOOD PRESSURE: 84 MMHG | HEART RATE: 55 BPM | HEIGHT: 70 IN | OXYGEN SATURATION: 97 % | WEIGHT: 207 LBS | SYSTOLIC BLOOD PRESSURE: 157 MMHG

## 2023-07-25 PROCEDURE — 93000 ELECTROCARDIOGRAM COMPLETE: CPT

## 2023-07-25 PROCEDURE — 99215 OFFICE O/P EST HI 40 MIN: CPT | Mod: 25

## 2023-09-13 ENCOUNTER — NON-APPOINTMENT (OUTPATIENT)
Age: 58
End: 2023-09-13

## 2023-09-13 ENCOUNTER — APPOINTMENT (OUTPATIENT)
Dept: ELECTROPHYSIOLOGY | Facility: CLINIC | Age: 58
End: 2023-09-13
Payer: COMMERCIAL

## 2023-09-13 VITALS
DIASTOLIC BLOOD PRESSURE: 80 MMHG | HEIGHT: 70 IN | HEART RATE: 55 BPM | BODY MASS INDEX: 28.63 KG/M2 | OXYGEN SATURATION: 96 % | WEIGHT: 200 LBS | SYSTOLIC BLOOD PRESSURE: 146 MMHG

## 2023-09-13 PROCEDURE — 99214 OFFICE O/P EST MOD 30 MIN: CPT | Mod: 25

## 2023-09-13 PROCEDURE — 93000 ELECTROCARDIOGRAM COMPLETE: CPT

## 2023-10-15 ENCOUNTER — TRANSCRIPTION ENCOUNTER (OUTPATIENT)
Age: 58
End: 2023-10-15

## 2023-10-19 ENCOUNTER — RX RENEWAL (OUTPATIENT)
Age: 58
End: 2023-10-19

## 2023-10-20 ENCOUNTER — RX RENEWAL (OUTPATIENT)
Age: 58
End: 2023-10-20

## 2023-10-20 RX ORDER — HYDROCHLOROTHIAZIDE 25 MG/1
25 TABLET ORAL DAILY
Qty: 90 | Refills: 2 | Status: ACTIVE | COMMUNITY
Start: 2020-06-17 | End: 1900-01-01

## 2023-11-28 ENCOUNTER — APPOINTMENT (OUTPATIENT)
Dept: PULMONOLOGY | Facility: CLINIC | Age: 58
End: 2023-11-28
Payer: COMMERCIAL

## 2023-11-28 VITALS — SYSTOLIC BLOOD PRESSURE: 152 MMHG | OXYGEN SATURATION: 98 % | DIASTOLIC BLOOD PRESSURE: 86 MMHG | HEART RATE: 59 BPM

## 2023-11-28 VITALS — DIASTOLIC BLOOD PRESSURE: 73 MMHG | SYSTOLIC BLOOD PRESSURE: 135 MMHG

## 2023-11-28 PROCEDURE — 99213 OFFICE O/P EST LOW 20 MIN: CPT

## 2023-11-28 PROCEDURE — 94762 N-INVAS EAR/PLS OXIMTRY CONT: CPT

## 2023-12-13 ENCOUNTER — NON-APPOINTMENT (OUTPATIENT)
Age: 58
End: 2023-12-13

## 2024-01-16 ENCOUNTER — APPOINTMENT (OUTPATIENT)
Dept: CARDIOLOGY | Facility: CLINIC | Age: 59
End: 2024-01-16
Payer: COMMERCIAL

## 2024-01-16 VITALS
SYSTOLIC BLOOD PRESSURE: 156 MMHG | HEART RATE: 52 BPM | DIASTOLIC BLOOD PRESSURE: 82 MMHG | WEIGHT: 200 LBS | HEIGHT: 70 IN | BODY MASS INDEX: 28.63 KG/M2 | OXYGEN SATURATION: 97 %

## 2024-01-16 DIAGNOSIS — I48.0 PAROXYSMAL ATRIAL FIBRILLATION: ICD-10-CM

## 2024-01-16 DIAGNOSIS — E78.5 HYPERLIPIDEMIA, UNSPECIFIED: ICD-10-CM

## 2024-01-16 PROCEDURE — 93000 ELECTROCARDIOGRAM COMPLETE: CPT

## 2024-01-16 PROCEDURE — 99215 OFFICE O/P EST HI 40 MIN: CPT | Mod: 25

## 2024-01-16 NOTE — DISCUSSION/SUMMARY
[FreeTextEntry1] : Mr. Combs underwent a successful atrial fibrillation ablation procedure on 1/10/2023.  He has not experienced recurrence of his previously reported palpitations since undergoing this procedure.  He does not describe having experienced any signs or symptoms to suggest the presence of an anginal syndrome, congestive heart failure, or a hemodynamically-compromising arrhythmia. His cardiac examination today is unremarkable. His blood pressure reading today was moderately elevated upon initial presentation to the office, with a follow-up measurement obtained after his examination revealing the reading to be satisfactory.  His electrocardiogram today reveals sinus bradycardia with a nonspecific RSR' pattern in leads V1-V2, and is otherwise an unremarkable tracing, essentially unchanged from his previous office tracing, allowing for lead placement variation.  As the patient reports his home readings to have been predominantly running in the normal range since his previous visit here, as well as his blood pressure reading in the office today being satisfactory, I have instructed him to continue his antihypertensive therapy as presently prescribed for the time being.  I have reviewed the findings of the extended Holter monitor study of 4/14/2023 in detail with the patient today.  I have reviewed the findings of the echocardiogram of 2/8/2023 and the exercise stress test of 11/1/21 in detail with the patient today.  I have reviewed the findings of the blood test report of 3/27/2023 in detail with the patient today, and I have instructed him to continue the present dosages of Crestor and Zetia for the time being.  He anticipates having follow-up blood testing performed through the office of Dr. Diamond in March 2024 for reassessment.  The importance of dietary modification, weight loss, regular exercise, and cessation of alcohol consumption was again emphasized to the patient today.  The patient will be following up with the pulmonologist regarding management of obstructive sleep apnea.  I have asked the patient to call me if he should have any questions or problems pertaining to these matters, and especially if he should experience any concerning symptoms. I have otherwise asked him to return to the office for follow-up cardiac evaluation and blood pressure reassessment in 6 months, provided he remains clinically stable in the interim. [EKG obtained to assist in diagnosis and management of assessed problem(s)] : EKG obtained to assist in diagnosis and management of assessed problem(s)

## 2024-01-16 NOTE — PHYSICAL EXAM
[General Appearance - In No Acute Distress] : no acute distress [Normal Conjunctiva] : the conjunctiva exhibited no abnormalities [No Oral Pallor] : no oral pallor [] : no respiratory distress [Respiration, Rhythm And Depth] : normal respiratory rhythm and effort [Auscultation Breath Sounds / Voice Sounds] : lungs were clear to auscultation bilaterally [Bowel Sounds] : normal bowel sounds [Abdomen Tenderness] : non-tender [Abnormal Walk] : normal gait [Cyanosis, Localized] : no localized cyanosis [Skin Color & Pigmentation] : normal skin color and pigmentation [Oriented To Time, Place, And Person] : oriented to person, place, and time [Not Palpable] : not palpable [No Precordial Heave] : no precordial heave was noted [Normal Rate] : normal [Rhythm Regular] : regular [Normal S1] : normal S1 [Normal S2] : normal S2 [No Gallop] : no gallop heard [No Murmur] : no murmurs heard [2+] : left 2+ [No Abnormalities] : the abdominal aorta was not enlarged and no bruit was heard [No Pitting Edema] : no pitting edema present [FreeTextEntry1] : no JVD is appreciated at a 45 angle [Apical Thrill] : no thrill palpable at the apex [Click] : no click [Pericardial Rub] : no pericardial rub [Right Carotid Bruit] : no bruit heard over the right carotid [Left Carotid Bruit] : no bruit heard over the left carotid

## 2024-01-16 NOTE — REVIEW OF SYSTEMS
[Blurry Vision] : blurred vision [Negative] : Heme/Lymph [FreeTextEntry3] : See HPI [FreeTextEntry5] : See HPI

## 2024-01-16 NOTE — CARDIOLOGY SUMMARY
[de-identified] : 1/16/24 -sinus bradycardia at a rate of 51 bpm.  Nonspecific RSR' pattern in leads V1-V2.

## 2024-01-16 NOTE — HISTORY OF PRESENT ILLNESS
[FreeTextEntry1] : Mr. Mendoza Combs presented to the office today for follow-up cardiac evaluation. I evaluated the patient on 6/10/20 in initial cardiology consultation regarding management of hypertension. I last evaluated the patient in the office on 7/25/2023 regarding management of hypertension and paroxysmal atrial fibrillation.  The patient is a 58-year-old male with a history of paroxysmal atrial fibrillation (status post atrial fibrillation ablation procedure 1/10/2023), hypertension, a dyslipidemia, pre-diabetes, obstructive sleep apnea (CPAP) cervical arthritis, and osteoarthritis of the right hip.  He was treated with doxycycline for Lyme disease in July 2022.  He underwent medial branch block facet injections to L3, L4, and L5 on 9/19/2022.  He was diagnosed as having developed a left eye retinal hemorrhage on 9/5/2022.  The patient has been doing well from a cardiac symptomatic standpoint since his previous visit here on 7/25/2023.  Specifically, he has not experienced recurrence of his previous palpitations since undergoing the atrial fibrillation ablation procedure on 1/10/2023.  He has not experienced chest discomfort or dyspnea on exertion in association with his activities.  He has not noted orthopnea or paroxysmal nocturnal dyspnea (CPAP for AFIA).  He has not noted lower extremity edema.  He has not experienced any episodes of presyncope or syncope.  At the time of a previous visit here on 6/6/2023, the dosage of Norvasc was increased from 2.5 mg daily to 5 mg daily, in an effort to more optimally control the patient's blood pressure.  He was maintained on Avapro 300 mg daily, HCTZ 25 mg daily, and Toprol-XL 50 mg twice daily as well.  He has been monitoring his blood pressure readings at home, and has noted the morning readings to be significantly improved since the dosage of Norvasc was increased, typically running in the 130's / 80's.  His readings in the late morning and afternoon have been running well within the normal range.  The patient consulted with a pulmonologist, Dr. Arambula for an evaluation of sleep apnea, noting that I had referred him for this consultation in view of his wife having noted the patient to snore loudly at night.  A home sleep study revealed evidence for severe obstructive sleep apnea.  CPAP therapy was subsequently initiated.  On 9/5/2022, the patient awakened in the morning and noted floaters in his left eye.  He was evaluated by his ophthalmologist the following day, and referred to a retinal specialist, who he consulted with on 9/7/2022.  He was diagnosed as having had "a hemorrhage behind the retina", for which he received "an injection".  He subsequently underwent laser photocoagulation of the left eye on 10/5/2022.  Review of systems is significant for the patient having suffered a tick bite in July 2022, and subsequently noting an expanding rash (?  Bull's-eye rash).  He consulted with his physician, and was treated with a 2-week course of doxycycline for suspected Lyme disease.  A Lyme IgG/IgM titer on 8/31/2022 was positive.  Echocardiography most recently performed on 2/8/2023 revealed normal cardiac chamber sizes with normal left ventricular wall thickness and wall motion.  Left ventricular systolic function was normal, with an estimated ejection fraction of 65%.  Normal left ventricular diastolic function was demonstrated.  No significant valvular abnormalities were demonstrated.  There was no evidence of pulmonary hypertension.  Exercise stress testing performed on 11/1/2021 revealed the patient to exhibit excellent exercise tolerance, without the inducement of cardiac symptoms or electrocardiographic evidence of myocardial ischemia at 79% of the age-predicted maximum heart rate, noting that there was a blunted heart rate response to exercise secondary to residual effect of beta-blocker therapy. There was baseline hypertension with a hypertensive response to exercise. No significant arrhythmias were exhibited.  An extended Holter monitor study (2 weeks) initiated on 4/14/2023 revealed the predominant rhythm throughout the recording to be sinus rhythm at an average rate of 66 bpm, with a range of 43 to 116 bpm.  1 brief episode of non-sustained supraventricular tachycardia was exhibited (4 beats in duration at a maximum rate of 114 bpm).  Rare supraventricular premature contractions were exhibited, including rare couplets.  Rare ventricular premature contractions were exhibited.  No episodes of ventricular tachycardia were exhibited.  No significant pauses were exhibited.  A  24-hour ambulatory blood pressure monitoring study most recently performed from 4/20/22 - 4/21/22 revealed the average reading to be 140/88 with 51% of systolic readings being in the elevated range and 49% of diastolic readings being in the elevated range.  The average daytime reading was 147/92 and the average nighttime reading was 116/72.  Laboratory studies performed on 3/27/2023 (on Crestor 40 mg daily and Zetia 10 mg daily) revealed cholesterol 152, triglycerides 55, HDL 61, and calculated LDL 80.  The liver chemistries were normal.  The BUN and creatinine were 17 and 0.99, respectively.  The potassium level was 3.8.  The glucose level was 94 and the hemoglobin A1c level was 5.6%.  Hemoglobin and hematocrit were 15.2 and 44.5, respectively.  The TSH level was 1.60.  Previous History:  The patient received medial branch block steroid facet injections to L3, L4, and L5 on 9/19/2022.  For the first few days following these injections, the patient noted a sensation of tachycardia.  At the time of a follow-up visit with me on 10/4/2022, the patient reported that since that time, he had occasionally noted a sensation of tachycardia, persisting for up to a few minutes without associated symptoms.  At these times, he had checked his pulse and detected "skipped beats".  I instructed him to increase his dosage of Toprol-XL from 50 mg daily to 100 mg daily, and I referred the patient for a 1 week extended Holter monitor study for further evaluation of these palpitations, which was performed from 10/8/22 - 10/15/22, revealing episodes of paroxysmal atrial fibrillation/flutter (4% burden), with the longest episode having persisted for 9 minutes and 20 seconds with an average ventricular rate of 110 bpm.  I discussed these findings with the patient on the telephone on 10/24/2022, at which time I referred him for consultation with an electrophysiologist, and he consulted with Dr. Quinonez on 11/21/2022.  Anticoagulation was initiated ([id number]), and the patient subsequently underwent a successful atrial fibrillation ablation procedure on 1/10/2023.  He underwent a follow-up extended Holter monitor study for 2 weeks, initiated on 4/14/2023, which did not demonstrate recurrence of paroxysmal atrial fibrillation.  Anticoagulation with Eliquis was discontinued at that time.  At the time of the patient's initial visit here on 6/10/20, as part of his evaluation, I referred him for exercise stress testing. He presented for the stress test on 6/15/20, however, he was exhibiting a markedly elevated blood pressure reading at that time, and the study was not performed. He underwent a 24-hour ambulatory blood pressure monitoring study on 6/16/20, which revealed the average blood pressure reading to be 140/92, with approximately half of the readings being above the normal range. HCTZ 12.5 mg daily was added to his antihypertensive medication regimen on 6/17/20. The patient subsequently underwent the exercise stress study on 7/7/20, exhibiting baseline hypertension with a hypertensive response to exercise. There was submaximal heart rate achievement (79% MPHR) secondary to a blunted heart rate response to exercise, in all likelihood related to residual effect of beta-blocker therapy. The study was negative for the inducement of cardiac symptoms, electrocardiographic evidence of myocardial ischemia, or significant arrhythmias.  At the time of a follow-up visit here on 7/21/20, the dosage of HCTZ was increased to 25 mg daily, in an effort to more optimally control the patient's blood pressure. The dosage of Toprol-XL was increased to 100 mg daily at the time of a follow-up visit here on 8/17/20.  Norvasc 5 mg daily was added at the time of a follow-up visit here on 3/15/2021, however, the patient was unable to tolerate this medication, stating that he developed lightheadedness and dizziness after initiating this medication, as well as episodes of an elevated heart rate, and hence, he discontinued the medication after 3 days.  He subsequently began following a low-sodium diet and eliminated daily beer drinking (up to 5 beers per day), and states that his home blood pressure readings subsequently began predominantly running in the normal range.  A follow-up 24-hour ambulatory blood pressure monitoring study performed on 6/8/2021 revealed the average reading to be 132/86, with the average daytime reading being 135/88 and the average nighttime reading being 119/80.  A follow-up 24-hour ambulatory blood pressure monitoring study performed on 4/20/2022 revealed the average reading to be 140/88, with an average daytime reading of 147/92 and an average nighttime reading of 116/72 (the patient reports having had a stressful day on the day he wore the monitor).  A  24-hour ambulatory blood pressure monitoring study most recently performed from 4/20/22 - 4/21/22 revealed the average reading to be 140/88 with 51% of systolic readings being in the elevated range and 49% of diastolic readings being in the elevated range.  The average daytime reading was 147/92 and the average nighttime reading was 116/72.  The patient developed bilateral parotid gland swelling on 3/13/20, and ultimately tested positive for mumps. He was evaluated by an ENT specialist on 5/5/20, at which time his blood pressure was noted to be 161/101. The patient had a Telehealth visit with Dr. Diamond the following day, at which time he was advised to restart Avapro 150 mg daily (he had been treated with this medication for hypertension in the past, however, he states that the medication was discontinued after his blood pressure readings improved). The patient had a persistently elevated blood pressure readings at home, for which he increased the dosage of Avapro to 300 mg daily. His home readings remained elevated, however.  As far as risk factors for coronary artery disease are concerned, the patient has a history of hypertension, a dyslipidemia, and prediabetes. He denies a history of cigarette smoking. He has a family history of coronary artery disease, stating that his mother was diagnosed as having coronary artery disease at age 60, underwent CABG at age 63, and passed away in her sleep at age 64.  Other than previously stated, past medical/surgical history significant for trigger fingers (left fourth digit, right third digit), a varicocele, and a tonsillectomy.  Until recently, the patient reports having been consuming 4-5 glasses of wine and/or beers per day.

## 2024-01-19 ENCOUNTER — RX RENEWAL (OUTPATIENT)
Age: 59
End: 2024-01-19

## 2024-01-19 RX ORDER — IRBESARTAN 300 MG/1
300 TABLET ORAL
Qty: 90 | Refills: 3 | Status: ACTIVE | COMMUNITY
Start: 2020-05-28

## 2024-02-08 ENCOUNTER — APPOINTMENT (OUTPATIENT)
Dept: UROLOGY | Facility: CLINIC | Age: 59
End: 2024-02-08
Payer: COMMERCIAL

## 2024-02-08 DIAGNOSIS — N52.9 MALE ERECTILE DYSFUNCTION, UNSPECIFIED: ICD-10-CM

## 2024-02-08 DIAGNOSIS — I86.1 SCROTAL VARICES: ICD-10-CM

## 2024-02-08 PROCEDURE — 99203 OFFICE O/P NEW LOW 30 MIN: CPT

## 2024-02-08 NOTE — REVIEW OF SYSTEMS
[Palpitations] : palpitations [Seen by urologist before (Name)  ___] : Preciously seen by a urologist: [unfilled] [Negative] : Heme/Lymph

## 2024-02-08 NOTE — PHYSICAL EXAM
[General Appearance - Well Developed] : well developed [General Appearance - Well Nourished] : well nourished [Normal Appearance] : normal appearance [Well Groomed] : well groomed [General Appearance - In No Acute Distress] : no acute distress [Edema] : no peripheral edema [] : no respiratory distress [Respiration, Rhythm And Depth] : normal respiratory rhythm and effort [Exaggerated Use Of Accessory Muscles For Inspiration] : no accessory muscle use [Abdomen Soft] : soft [Abdomen Tenderness] : non-tender [Costovertebral Angle Tenderness] : no ~M costovertebral angle tenderness [Penis Abnormality] : normal circumcised penis [Scrotum] : the scrotum was normal [Testes Tenderness] : no tenderness of the testes [Testes Mass (___cm)] : there were no testicular masses [Normal Station and Gait] : the gait and station were normal for the patient's age [No Focal Deficits] : no focal deficits [Oriented To Time, Place, And Person] : oriented to person, place, and time [Affect] : the affect was normal [Mood] : the mood was normal [Inguinal Lymph Nodes Enlarged Bilaterally] : inguinal [de-identified] : Smaller left testis and grade 2 varicoceles

## 2024-02-08 NOTE — ASSESSMENT
[FreeTextEntry1] : He has grade 2 left-sided varicoceles.  They could be causing his discomfort.  He could use ice packs, scrotal elevation and anti-inflammatory agents as needed.  He will undergo scrotal ultrasound and get back to me with his response.  Also management of erectile dysfunction with medication such as sildenafil or tadalafil was discussed.  He would rather continue monitoring for now.  If symptoms worsen, he will get back to me.

## 2024-02-08 NOTE — HISTORY OF PRESENT ILLNESS
[FreeTextEntry1] : He is a 58-year-old man who is seen today for initial visit.  He had left-sided testicular pain a few years ago on and around 2008 he was told to have left-sided varicoceles.  He has 2 children.  Again around December he had left-sided testicular pain that radiated into the groin area.  He has no voiding symptoms.  After he started using CPAP, nocturia decreased from 5 times a night to only once.  He also is complaining of difficulty with rigidity of the erection.  In April 2023 urinalysis was normal and PSA level was 0.5.

## 2024-02-12 ENCOUNTER — TRANSCRIPTION ENCOUNTER (OUTPATIENT)
Age: 59
End: 2024-02-12

## 2024-02-14 ENCOUNTER — APPOINTMENT (OUTPATIENT)
Dept: ULTRASOUND IMAGING | Facility: CLINIC | Age: 59
End: 2024-02-14
Payer: COMMERCIAL

## 2024-02-14 ENCOUNTER — OUTPATIENT (OUTPATIENT)
Dept: OUTPATIENT SERVICES | Facility: HOSPITAL | Age: 59
LOS: 1 days | End: 2024-02-14
Payer: COMMERCIAL

## 2024-02-14 DIAGNOSIS — Z00.8 ENCOUNTER FOR OTHER GENERAL EXAMINATION: ICD-10-CM

## 2024-02-14 PROCEDURE — 76870 US EXAM SCROTUM: CPT

## 2024-02-14 PROCEDURE — 76870 US EXAM SCROTUM: CPT | Mod: 26

## 2024-02-20 ENCOUNTER — RX RENEWAL (OUTPATIENT)
Age: 59
End: 2024-02-20

## 2024-02-20 RX ORDER — ROSUVASTATIN CALCIUM 40 MG/1
40 TABLET, FILM COATED ORAL DAILY
Qty: 90 | Refills: 3 | Status: ACTIVE | COMMUNITY
Start: 2021-06-09

## 2024-03-05 ENCOUNTER — APPOINTMENT (OUTPATIENT)
Dept: PULMONOLOGY | Facility: CLINIC | Age: 59
End: 2024-03-05
Payer: COMMERCIAL

## 2024-03-05 VITALS — OXYGEN SATURATION: 98 % | HEART RATE: 73 BPM | DIASTOLIC BLOOD PRESSURE: 68 MMHG | SYSTOLIC BLOOD PRESSURE: 136 MMHG

## 2024-03-05 PROCEDURE — 99213 OFFICE O/P EST LOW 20 MIN: CPT

## 2024-03-05 NOTE — PROCEDURE
[FreeTextEntry1] : Reviewed:  overnight oximetry with cpap--no significant desaturations  HST--severe tova ahi 58, desats to 60's

## 2024-03-05 NOTE — HISTORY OF PRESENT ILLNESS
[TextBox_4] : using cpap  no complaints had overnight oximetry without desats requests nasal pillow mask bc full face getting too hot at night

## 2024-04-15 ENCOUNTER — RX RENEWAL (OUTPATIENT)
Age: 59
End: 2024-04-15

## 2024-04-15 RX ORDER — AMLODIPINE BESYLATE 5 MG/1
5 TABLET ORAL DAILY
Qty: 90 | Refills: 3 | Status: ACTIVE | COMMUNITY
Start: 2022-11-07 | End: 1900-01-01

## 2024-04-15 RX ORDER — METOPROLOL SUCCINATE 50 MG/1
50 TABLET, EXTENDED RELEASE ORAL TWICE DAILY
Qty: 180 | Refills: 3 | Status: ACTIVE | COMMUNITY
Start: 2020-06-10 | End: 1900-01-01

## 2024-05-30 ENCOUNTER — APPOINTMENT (OUTPATIENT)
Dept: INTERNAL MEDICINE | Facility: CLINIC | Age: 59
End: 2024-05-30
Payer: COMMERCIAL

## 2024-05-30 VITALS — DIASTOLIC BLOOD PRESSURE: 82 MMHG | SYSTOLIC BLOOD PRESSURE: 138 MMHG

## 2024-05-30 VITALS
HEIGHT: 70 IN | RESPIRATION RATE: 16 BRPM | TEMPERATURE: 97.8 F | BODY MASS INDEX: 29.35 KG/M2 | OXYGEN SATURATION: 98 % | WEIGHT: 205 LBS | DIASTOLIC BLOOD PRESSURE: 88 MMHG | HEART RATE: 62 BPM | SYSTOLIC BLOOD PRESSURE: 148 MMHG

## 2024-05-30 DIAGNOSIS — M79.675 PAIN IN LEFT TOE(S): ICD-10-CM

## 2024-05-30 DIAGNOSIS — Z87.898 PERSONAL HISTORY OF OTHER SPECIFIED CONDITIONS: ICD-10-CM

## 2024-05-30 DIAGNOSIS — M54.59 OTHER LOW BACK PAIN: ICD-10-CM

## 2024-05-30 DIAGNOSIS — E78.5 HYPERLIPIDEMIA, UNSPECIFIED: ICD-10-CM

## 2024-05-30 DIAGNOSIS — I10 ESSENTIAL (PRIMARY) HYPERTENSION: ICD-10-CM

## 2024-05-30 DIAGNOSIS — H61.23 IMPACTED CERUMEN, BILATERAL: ICD-10-CM

## 2024-05-30 DIAGNOSIS — Z00.00 ENCOUNTER FOR GENERAL ADULT MEDICAL EXAMINATION W/OUT ABNORMAL FINDINGS: ICD-10-CM

## 2024-05-30 DIAGNOSIS — Z12.11 ENCOUNTER FOR SCREENING FOR MALIGNANT NEOPLASM OF COLON: ICD-10-CM

## 2024-05-30 PROCEDURE — 99396 PREV VISIT EST AGE 40-64: CPT

## 2024-06-04 ENCOUNTER — APPOINTMENT (OUTPATIENT)
Dept: PULMONOLOGY | Facility: CLINIC | Age: 59
End: 2024-06-04
Payer: COMMERCIAL

## 2024-06-04 VITALS — SYSTOLIC BLOOD PRESSURE: 132 MMHG | OXYGEN SATURATION: 98 % | HEART RATE: 58 BPM | DIASTOLIC BLOOD PRESSURE: 72 MMHG

## 2024-06-04 DIAGNOSIS — G47.33 OBSTRUCTIVE SLEEP APNEA (ADULT) (PEDIATRIC): ICD-10-CM

## 2024-06-04 PROCEDURE — 99213 OFFICE O/P EST LOW 20 MIN: CPT

## 2024-06-04 NOTE — HISTORY OF PRESENT ILLNESS
[TextBox_4] : using auto cpap no complaints feels well bp well controlled back to full face mask, didn't like nasal pillow

## 2024-06-05 ENCOUNTER — TRANSCRIPTION ENCOUNTER (OUTPATIENT)
Age: 59
End: 2024-06-05

## 2024-06-05 LAB
ALBUMIN SERPL ELPH-MCNC: 4.9 G/DL
ALP BLD-CCNC: 62 U/L
ALT SERPL-CCNC: 41 U/L
ANION GAP SERPL CALC-SCNC: 13 MMOL/L
APPEARANCE: CLEAR
AST SERPL-CCNC: 29 U/L
BILIRUB SERPL-MCNC: 0.6 MG/DL
BILIRUBIN URINE: NEGATIVE
BLOOD URINE: NEGATIVE
BUN SERPL-MCNC: 14 MG/DL
CALCIUM SERPL-MCNC: 9.4 MG/DL
CHLORIDE SERPL-SCNC: 101 MMOL/L
CHOLEST SERPL-MCNC: 160 MG/DL
CO2 SERPL-SCNC: 25 MMOL/L
COLOR: YELLOW
CREAT SERPL-MCNC: 0.89 MG/DL
EGFR: 100 ML/MIN/1.73M2
ESTIMATED AVERAGE GLUCOSE: 111 MG/DL
GLUCOSE QUALITATIVE U: NEGATIVE MG/DL
GLUCOSE SERPL-MCNC: 114 MG/DL
HBA1C MFR BLD HPLC: 5.5 %
HCT VFR BLD CALC: 41 %
HDLC SERPL-MCNC: 63 MG/DL
HGB BLD-MCNC: 14.7 G/DL
KETONES URINE: NEGATIVE MG/DL
LDLC SERPL CALC-MCNC: 86 MG/DL
LEUKOCYTE ESTERASE URINE: NEGATIVE
MCHC RBC-ENTMCNC: 32.3 PG
MCHC RBC-ENTMCNC: 35.9 GM/DL
MCV RBC AUTO: 90.1 FL
NITRITE URINE: NEGATIVE
NONHDLC SERPL-MCNC: 97 MG/DL
PH URINE: 6.5
PLATELET # BLD AUTO: 235 K/UL
POTASSIUM SERPL-SCNC: 3.6 MMOL/L
PROT SERPL-MCNC: 7.3 G/DL
PROTEIN URINE: NEGATIVE MG/DL
PSA SERPL-MCNC: 0.54 NG/ML
RBC # BLD: 4.55 M/UL
RBC # FLD: 12.7 %
SODIUM SERPL-SCNC: 139 MMOL/L
SPECIFIC GRAVITY URINE: 1.01
TRIGL SERPL-MCNC: 59 MG/DL
TSH SERPL-ACNC: 1.21 UIU/ML
UROBILINOGEN URINE: 0.2 MG/DL
WBC # FLD AUTO: 6.61 K/UL

## 2024-07-05 ENCOUNTER — APPOINTMENT (OUTPATIENT)
Dept: PULMONOLOGY | Facility: CLINIC | Age: 59
End: 2024-07-05

## 2024-07-09 ENCOUNTER — APPOINTMENT (OUTPATIENT)
Dept: DERMATOLOGY | Facility: CLINIC | Age: 59
End: 2024-07-09
Payer: COMMERCIAL

## 2024-07-09 DIAGNOSIS — D48.5 NEOPLASM OF UNCERTAIN BEHAVIOR OF SKIN: ICD-10-CM

## 2024-07-09 DIAGNOSIS — L60.8 OTHER NAIL DISORDERS: ICD-10-CM

## 2024-07-09 DIAGNOSIS — L82.1 OTHER SEBORRHEIC KERATOSIS: ICD-10-CM

## 2024-07-09 DIAGNOSIS — Z12.83 ENCOUNTER FOR SCREENING FOR MALIGNANT NEOPLASM OF SKIN: ICD-10-CM

## 2024-07-09 DIAGNOSIS — D22.9 MELANOCYTIC NEVI, UNSPECIFIED: ICD-10-CM

## 2024-07-09 DIAGNOSIS — L82.0 INFLAMED SEBORRHEIC KERATOSIS: ICD-10-CM

## 2024-07-09 PROCEDURE — 11102 TANGNTL BX SKIN SINGLE LES: CPT

## 2024-07-09 PROCEDURE — 99214 OFFICE O/P EST MOD 30 MIN: CPT | Mod: 25

## 2024-07-12 ENCOUNTER — RX RENEWAL (OUTPATIENT)
Age: 59
End: 2024-07-12

## 2024-07-15 ENCOUNTER — APPOINTMENT (OUTPATIENT)
Dept: CARDIOLOGY | Facility: CLINIC | Age: 59
End: 2024-07-15
Payer: COMMERCIAL

## 2024-07-15 VITALS
HEIGHT: 70 IN | SYSTOLIC BLOOD PRESSURE: 149 MMHG | BODY MASS INDEX: 29.2 KG/M2 | HEART RATE: 52 BPM | OXYGEN SATURATION: 95 % | DIASTOLIC BLOOD PRESSURE: 86 MMHG | WEIGHT: 204 LBS

## 2024-07-15 DIAGNOSIS — I48.0 PAROXYSMAL ATRIAL FIBRILLATION: ICD-10-CM

## 2024-07-15 DIAGNOSIS — I10 ESSENTIAL (PRIMARY) HYPERTENSION: ICD-10-CM

## 2024-07-15 DIAGNOSIS — E78.5 HYPERLIPIDEMIA, UNSPECIFIED: ICD-10-CM

## 2024-07-15 PROCEDURE — 99215 OFFICE O/P EST HI 40 MIN: CPT | Mod: 25

## 2024-07-15 PROCEDURE — 93000 ELECTROCARDIOGRAM COMPLETE: CPT

## 2024-07-15 PROCEDURE — G2211 COMPLEX E/M VISIT ADD ON: CPT | Mod: NC

## 2024-07-22 ENCOUNTER — TRANSCRIPTION ENCOUNTER (OUTPATIENT)
Age: 59
End: 2024-07-22

## 2024-08-06 ENCOUNTER — APPOINTMENT (OUTPATIENT)
Dept: GASTROENTEROLOGY | Facility: CLINIC | Age: 59
End: 2024-08-06

## 2024-08-06 PROBLEM — Z78.9 AT AVERAGE RISK FOR COLON CANCER: Status: ACTIVE | Noted: 2024-08-06

## 2024-08-06 PROCEDURE — 99204 OFFICE O/P NEW MOD 45 MIN: CPT

## 2024-08-06 NOTE — ASSESSMENT
[FreeTextEntry1] : Impression: Average risk colorectal cancer for first routine screening colonoscopy.  Had diagnostic colonoscopy many years ago.  No GI complaints.  History of A-fib status post successful ablation.  Hypertension controlled on current meds.  Plan: Schedule colonoscopy with Suprep

## 2024-08-06 NOTE — HISTORY OF PRESENT ILLNESS
[FreeTextEntry1] : 58-year-old male with hypertension and hyperlipidemia, history of A-fib status post successful ablation last year referred for first routine screening colonoscopy.  Had a colonoscopy in 2010 for rectal bleeding turned out to be secondary to hemorrhoids.  No symptoms or problems since then.  No family history of colon cancer or polyps.

## 2024-10-02 ENCOUNTER — APPOINTMENT (OUTPATIENT)
Dept: GASTROENTEROLOGY | Facility: AMBULATORY MEDICAL SERVICES | Age: 59
End: 2024-10-02
Payer: COMMERCIAL

## 2024-10-02 PROCEDURE — 45380 COLONOSCOPY AND BIOPSY: CPT | Mod: PT

## 2024-12-03 ENCOUNTER — APPOINTMENT (OUTPATIENT)
Dept: PULMONOLOGY | Facility: CLINIC | Age: 59
End: 2024-12-03
Payer: COMMERCIAL

## 2024-12-03 VITALS — DIASTOLIC BLOOD PRESSURE: 74 MMHG | OXYGEN SATURATION: 99 % | HEART RATE: 54 BPM | SYSTOLIC BLOOD PRESSURE: 127 MMHG

## 2024-12-03 DIAGNOSIS — G47.33 OBSTRUCTIVE SLEEP APNEA (ADULT) (PEDIATRIC): ICD-10-CM

## 2024-12-03 PROCEDURE — 99213 OFFICE O/P EST LOW 20 MIN: CPT

## 2025-01-07 ENCOUNTER — RX RENEWAL (OUTPATIENT)
Age: 60
End: 2025-01-07

## 2025-01-08 ENCOUNTER — RX RENEWAL (OUTPATIENT)
Age: 60
End: 2025-01-08

## 2025-01-13 ENCOUNTER — APPOINTMENT (OUTPATIENT)
Dept: CARDIOLOGY | Facility: CLINIC | Age: 60
End: 2025-01-13
Payer: COMMERCIAL

## 2025-01-13 ENCOUNTER — NON-APPOINTMENT (OUTPATIENT)
Age: 60
End: 2025-01-13

## 2025-01-13 VITALS
DIASTOLIC BLOOD PRESSURE: 89 MMHG | OXYGEN SATURATION: 100 % | SYSTOLIC BLOOD PRESSURE: 158 MMHG | WEIGHT: 210 LBS | HEIGHT: 70 IN | HEART RATE: 60 BPM | BODY MASS INDEX: 30.06 KG/M2

## 2025-01-13 DIAGNOSIS — E78.5 HYPERLIPIDEMIA, UNSPECIFIED: ICD-10-CM

## 2025-01-13 DIAGNOSIS — I48.0 PAROXYSMAL ATRIAL FIBRILLATION: ICD-10-CM

## 2025-01-13 DIAGNOSIS — I10 ESSENTIAL (PRIMARY) HYPERTENSION: ICD-10-CM

## 2025-01-13 LAB
ALBUMIN SERPL ELPH-MCNC: 4.6 G/DL
ALP BLD-CCNC: 66 U/L
ALT SERPL-CCNC: 38 U/L
ANION GAP SERPL CALC-SCNC: 11 MMOL/L
AST SERPL-CCNC: 24 U/L
BILIRUB SERPL-MCNC: 0.5 MG/DL
BUN SERPL-MCNC: 21 MG/DL
CALCIUM SERPL-MCNC: 9.9 MG/DL
CHLORIDE SERPL-SCNC: 103 MMOL/L
CHOLEST SERPL-MCNC: 157 MG/DL
CO2 SERPL-SCNC: 28 MMOL/L
CREAT SERPL-MCNC: 1.08 MG/DL
EGFR: 80 ML/MIN/1.73M2
ESTIMATED AVERAGE GLUCOSE: 117 MG/DL
GLUCOSE SERPL-MCNC: 124 MG/DL
HBA1C MFR BLD HPLC: 5.7 %
HDLC SERPL-MCNC: 63 MG/DL
LDLC SERPL CALC-MCNC: 84 MG/DL
NONHDLC SERPL-MCNC: 94 MG/DL
POTASSIUM SERPL-SCNC: 3.9 MMOL/L
PROT SERPL-MCNC: 7.1 G/DL
SODIUM SERPL-SCNC: 141 MMOL/L
TRIGL SERPL-MCNC: 47 MG/DL

## 2025-01-13 PROCEDURE — 93000 ELECTROCARDIOGRAM COMPLETE: CPT

## 2025-01-13 PROCEDURE — G2211 COMPLEX E/M VISIT ADD ON: CPT | Mod: NC

## 2025-01-13 PROCEDURE — 99215 OFFICE O/P EST HI 40 MIN: CPT

## 2025-04-05 ENCOUNTER — RX RENEWAL (OUTPATIENT)
Age: 60
End: 2025-04-05

## 2025-06-05 ENCOUNTER — APPOINTMENT (OUTPATIENT)
Dept: INTERNAL MEDICINE | Facility: CLINIC | Age: 60
End: 2025-06-05
Payer: COMMERCIAL

## 2025-06-05 VITALS
OXYGEN SATURATION: 99 % | RESPIRATION RATE: 16 BRPM | SYSTOLIC BLOOD PRESSURE: 133 MMHG | WEIGHT: 210 LBS | HEART RATE: 56 BPM | TEMPERATURE: 97.8 F | HEIGHT: 70 IN | DIASTOLIC BLOOD PRESSURE: 73 MMHG | BODY MASS INDEX: 30.06 KG/M2

## 2025-06-05 DIAGNOSIS — Z01.818 ENCOUNTER FOR OTHER PREPROCEDURAL EXAMINATION: ICD-10-CM

## 2025-06-05 DIAGNOSIS — R73.09 OTHER ABNORMAL GLUCOSE: ICD-10-CM

## 2025-06-05 DIAGNOSIS — Z78.9 OTHER SPECIFIED HEALTH STATUS: ICD-10-CM

## 2025-06-05 DIAGNOSIS — R73.01 IMPAIRED FASTING GLUCOSE: ICD-10-CM

## 2025-06-05 DIAGNOSIS — Z12.83 ENCOUNTER FOR SCREENING FOR MALIGNANT NEOPLASM OF SKIN: ICD-10-CM

## 2025-06-05 DIAGNOSIS — R03.0 ELEVATED BLOOD-PRESSURE READING, W/OUT DIAGNOSIS OF HYPERTENSION: ICD-10-CM

## 2025-06-05 DIAGNOSIS — E78.5 HYPERLIPIDEMIA, UNSPECIFIED: ICD-10-CM

## 2025-06-05 DIAGNOSIS — Z86.03 PERSONAL HISTORY OF NEOPLASM OF UNCERTAIN BEHAVIOR: ICD-10-CM

## 2025-06-05 DIAGNOSIS — L82.0 INFLAMED SEBORRHEIC KERATOSIS: ICD-10-CM

## 2025-06-05 DIAGNOSIS — Z23 ENCOUNTER FOR IMMUNIZATION: ICD-10-CM

## 2025-06-05 DIAGNOSIS — M65.331 TRIGGER FINGER, RIGHT MIDDLE FINGER: ICD-10-CM

## 2025-06-05 DIAGNOSIS — Z01.812 ENCOUNTER FOR PREPROCEDURAL LABORATORY EXAMINATION: ICD-10-CM

## 2025-06-05 DIAGNOSIS — Z87.898 PERSONAL HISTORY OF OTHER SPECIFIED CONDITIONS: ICD-10-CM

## 2025-06-05 DIAGNOSIS — L57.0 ACTINIC KERATOSIS: ICD-10-CM

## 2025-06-05 DIAGNOSIS — L60.8 OTHER NAIL DISORDERS: ICD-10-CM

## 2025-06-05 DIAGNOSIS — Z83.3 FAMILY HISTORY OF DIABETES MELLITUS: ICD-10-CM

## 2025-06-05 DIAGNOSIS — Z00.00 ENCOUNTER FOR GENERAL ADULT MEDICAL EXAMINATION W/OUT ABNORMAL FINDINGS: ICD-10-CM

## 2025-06-05 DIAGNOSIS — M65.342 TRIGGER FINGER, LEFT RING FINGER: ICD-10-CM

## 2025-06-05 DIAGNOSIS — Z11.52 ENCOUNTER FOR PREPROCEDURAL LABORATORY EXAMINATION: ICD-10-CM

## 2025-06-05 DIAGNOSIS — I10 ESSENTIAL (PRIMARY) HYPERTENSION: ICD-10-CM

## 2025-06-05 LAB
ALBUMIN SERPL ELPH-MCNC: 4.8 G/DL
ALP BLD-CCNC: 65 U/L
ALT SERPL-CCNC: 45 U/L
ANION GAP SERPL CALC-SCNC: 15 MMOL/L
APPEARANCE: CLEAR
AST SERPL-CCNC: 33 U/L
BILIRUB SERPL-MCNC: 0.9 MG/DL
BILIRUBIN URINE: NEGATIVE
BLOOD URINE: NEGATIVE
BUN SERPL-MCNC: 12 MG/DL
CALCIUM SERPL-MCNC: 9.6 MG/DL
CHLORIDE SERPL-SCNC: 100 MMOL/L
CHOLEST SERPL-MCNC: 158 MG/DL
CO2 SERPL-SCNC: 24 MMOL/L
COLOR: YELLOW
CREAT SERPL-MCNC: 1.05 MG/DL
EGFRCR SERPLBLD CKD-EPI 2021: 82 ML/MIN/1.73M2
ESTIMATED AVERAGE GLUCOSE: 120 MG/DL
GLUCOSE QUALITATIVE U: NEGATIVE MG/DL
GLUCOSE SERPL-MCNC: 122 MG/DL
HBA1C MFR BLD HPLC: 5.8 %
HCT VFR BLD CALC: 42 %
HDLC SERPL-MCNC: 56 MG/DL
HGB BLD-MCNC: 14.8 G/DL
KETONES URINE: NEGATIVE MG/DL
LDLC SERPL-MCNC: 88 MG/DL
LEUKOCYTE ESTERASE URINE: NEGATIVE
MCHC RBC-ENTMCNC: 32.5 PG
MCHC RBC-ENTMCNC: 35.2 G/DL
MCV RBC AUTO: 92.1 FL
NITRITE URINE: NEGATIVE
NONHDLC SERPL-MCNC: 102 MG/DL
PH URINE: 6.5
PLATELET # BLD AUTO: 240 K/UL
POTASSIUM SERPL-SCNC: 3.4 MMOL/L
PROT SERPL-MCNC: 7.3 G/DL
PROTEIN URINE: NEGATIVE MG/DL
PSA SERPL-MCNC: 0.65 NG/ML
RBC # BLD: 4.56 M/UL
RBC # FLD: 12.8 %
SODIUM SERPL-SCNC: 139 MMOL/L
SPECIFIC GRAVITY URINE: 1.01
TRIGL SERPL-MCNC: 69 MG/DL
TSH SERPL-ACNC: 1.14 UIU/ML
UROBILINOGEN URINE: 0.2 MG/DL
WBC # FLD AUTO: 6.58 K/UL

## 2025-06-05 PROCEDURE — 99396 PREV VISIT EST AGE 40-64: CPT

## 2025-06-12 ENCOUNTER — APPOINTMENT (OUTPATIENT)
Dept: DERMATOLOGY | Facility: CLINIC | Age: 60
End: 2025-06-12

## 2025-06-12 PROCEDURE — 99213 OFFICE O/P EST LOW 20 MIN: CPT

## 2025-06-17 LAB — POTASSIUM SERPL-SCNC: 3.6 MMOL/L

## 2025-07-10 ENCOUNTER — APPOINTMENT (OUTPATIENT)
Dept: INTERNAL MEDICINE | Facility: CLINIC | Age: 60
End: 2025-07-10
Payer: COMMERCIAL

## 2025-07-10 VITALS
WEIGHT: 210 LBS | TEMPERATURE: 98 F | BODY MASS INDEX: 30.06 KG/M2 | RESPIRATION RATE: 16 BRPM | OXYGEN SATURATION: 99 % | SYSTOLIC BLOOD PRESSURE: 137 MMHG | HEART RATE: 58 BPM | HEIGHT: 70 IN | DIASTOLIC BLOOD PRESSURE: 86 MMHG

## 2025-07-10 PROCEDURE — G2211 COMPLEX E/M VISIT ADD ON: CPT | Mod: NC

## 2025-07-10 PROCEDURE — 99213 OFFICE O/P EST LOW 20 MIN: CPT

## 2025-07-14 LAB
M TB IFN-G BLD-IMP: NEGATIVE
QUANTIFERON TB PLUS MITOGEN MINUS NIL: 3.63 IU/ML
QUANTIFERON TB PLUS NIL: 0.02 IU/ML
QUANTIFERON TB PLUS TB1 MINUS NIL: 0 IU/ML
QUANTIFERON TB PLUS TB2 MINUS NIL: 0 IU/ML

## 2025-07-15 ENCOUNTER — APPOINTMENT (OUTPATIENT)
Dept: CARDIOLOGY | Facility: CLINIC | Age: 60
End: 2025-07-15
Payer: COMMERCIAL

## 2025-07-15 VITALS
OXYGEN SATURATION: 98 % | SYSTOLIC BLOOD PRESSURE: 155 MMHG | BODY MASS INDEX: 30.06 KG/M2 | DIASTOLIC BLOOD PRESSURE: 92 MMHG | HEIGHT: 70 IN | HEART RATE: 54 BPM | WEIGHT: 210 LBS

## 2025-07-15 PROCEDURE — 93000 ELECTROCARDIOGRAM COMPLETE: CPT

## 2025-07-15 PROCEDURE — G2211 COMPLEX E/M VISIT ADD ON: CPT | Mod: NC

## 2025-07-15 PROCEDURE — 99215 OFFICE O/P EST HI 40 MIN: CPT

## 2025-07-17 ENCOUNTER — APPOINTMENT (OUTPATIENT)
Dept: INTERNAL MEDICINE | Facility: CLINIC | Age: 60
End: 2025-07-17
Payer: COMMERCIAL

## 2025-07-17 PROCEDURE — 90471 IMMUNIZATION ADMIN: CPT

## 2025-07-17 PROCEDURE — 90715 TDAP VACCINE 7 YRS/> IM: CPT
